# Patient Record
Sex: MALE | Race: WHITE | NOT HISPANIC OR LATINO | Employment: FULL TIME | URBAN - METROPOLITAN AREA
[De-identification: names, ages, dates, MRNs, and addresses within clinical notes are randomized per-mention and may not be internally consistent; named-entity substitution may affect disease eponyms.]

---

## 2017-08-08 ENCOUNTER — OFFICE VISIT (OUTPATIENT)
Dept: OPHTHALMOLOGY | Facility: CLINIC | Age: 42
End: 2017-08-08

## 2017-08-08 DIAGNOSIS — H02.726: ICD-10-CM

## 2017-08-08 DIAGNOSIS — H02.9 EYELID LESION: Primary | ICD-10-CM

## 2017-08-08 RX ORDER — ERYTHROMYCIN 5 MG/G
1 OINTMENT OPHTHALMIC 2 TIMES DAILY
Qty: 1 TUBE | Refills: 0
Start: 2017-08-08 | End: 2017-08-15

## 2017-08-08 RX ORDER — HYDROCODONE BITARTRATE AND ACETAMINOPHEN 5; 325 MG/1; MG/1
1-2 TABLET ORAL EVERY 6 HOURS PRN
Qty: 15 TABLET | Refills: 0 | Status: SHIPPED | OUTPATIENT
Start: 2017-08-08 | End: 2017-10-24

## 2017-08-08 RX ORDER — LIDOCAINE HYDROCHLORIDE AND EPINEPHRINE 15; 5 MG/ML; UG/ML
1 INJECTION, SOLUTION EPIDURAL ONCE
Qty: 1 ML | Refills: 0
Start: 2017-08-08 | End: 2017-08-08

## 2017-08-08 ASSESSMENT — VISUAL ACUITY
METHOD: SNELLEN - LINEAR
OD_SC: 20/30
OS_SC: 20/25
OD_SC+: -1

## 2017-08-08 ASSESSMENT — CONF VISUAL FIELD
OD_NORMAL: 1
OS_NORMAL: 1
METHOD: COUNTING FINGERS

## 2017-08-08 ASSESSMENT — TONOMETRY
IOP_METHOD: ICARE
OD_IOP_MMHG: 15
OS_IOP_MMHG: 14

## 2017-08-08 ASSESSMENT — SLIT LAMP EXAM - LIDS: COMMENTS: NORMAL

## 2017-08-08 NOTE — NURSING NOTE
Chief Complaints and History of Present Illnesses   Patient presents with     Consult For     Chalazion     HPI    Affected eye(s):  Left   Symptoms:     No blurred vision      Frequency:  Constant       Do you have eye pain now?:  No      Comments:  Pt states chalazion/stye started 4 months ago, pt used warm compresses- but feels it helped only a little bit. Zylet and Doxycycline 100mg.    Adrian Burnett COT 7:34 AM August 8, 2017

## 2017-08-08 NOTE — MR AVS SNAPSHOT
After Visit Summary   8/8/2017    Abhishek Delatorre    MRN: [id number]           Patient Information     Date Of Birth          1975        Visit Information        Provider Department      8/8/2017 7:30 AM Blanka Sequeira MD [facility]        Today's Diagnoses     Eyelid lesion    -  1    Madarosis, left           Follow-ups after your visit        Your next 10 appointments already scheduled     Aug 29, 2017  8:15 AM CDT   (Arrive by 8:00 AM)   Post-Op with Blanka Sequeira MD   [facility] ([facility])    [address] [place] [address]  [address] [facility] [place] [address] [phone]              Future tests that were ordered for you today     Open Future Orders        Priority Expected Expires Ordered    Surgical pathology exam Routine  11/6/2017 8/8/2017            Who to contact     Please call your clinic at [phone] to:    Ask questions about your health    Make or cancel appointments    Discuss your medicines    Learn about your test results    Speak to your doctor   If you have compliments or concerns about an experience at your clinic, or if you wish to file a complaint, please contact [facility] Patient Relations at [phone] or email us at [email or web address]         Additional Information About Your Visit        MyChart Information     [employer] gives you secure access to your electronic health record. If you see a primary care provider, you can also send messages to your care team and make appointments. If you have questions, please call your primary care clinic.  If you do not have a primary care provider, please call [phone] and they will assist you.      [employer] is an electronic gateway that provides easy, online access to your medical records. With [employer], you can request a clinic appointment, read your test results, renew a prescription or communicate with your care team.     To access  your existing account, please contact your [facility] or call [phone] for assistance.        Care EveryWhere ID     This is your Care EveryWhere ID. This could be used by other organizations to access your [facility] medical records  [id number]         Blood Pressure from Last 3 Encounters:   10/17/16 119/74   07/27/11 110/70   06/28/11 110/70    Weight from Last 3 Encounters:   10/17/16 80.7 kg (178 lb)   07/27/11 78.5 kg (173 lb)   06/06/11 79 kg (174 lb 3.2 oz)              We Performed the Following     External Photos OS (left eye)     Wedge resetion less than 25% of eyelid          Today's Medication Changes          These changes are accurate as of: 8/8/17  8:30 AM.  If you have any questions, ask your nurse or doctor.               These medicines have changed or have updated prescriptions.        Dose/Directions    * HYDROcodone-acetaminophen 5-325 MG per tablet   Commonly known as:  NORCO   This may have changed:  Another medication with the same name was added. Make sure you understand how and when to take each.   Used for:  Encounter for vasectomy   Changed by:  Vish Vizcarra MD        Dose:  1 tablet   Take 1 tablet by mouth every 6 hours as needed for pain.   Quantity:  20 tablet   Refills:  0       * HYDROcodone-acetaminophen 5-325 MG per tablet   Commonly known as:  NORCO   This may have changed:  You were already taking a medication with the same name, and this prescription was added. Make sure you understand how and when to take each.   Used for:  Eyelid lesion, Madarosis, left   Changed by:  Blanka Sequeira MD        Dose:  1-2 tablet   Take 1-2 tablets by mouth every 6 hours as needed for moderate to severe pain   Quantity:  15 tablet   Refills:  0       * Notice:  This list has 2 medication(s) that are the same as other medications prescribed for you. Read the directions carefully, and ask your doctor or other care provider to review them with you.          Where to get your medicines      Some of these will need a paper prescription and others can be bought over the counter.  Ask your nurse if you have questions.     Bring a paper prescription for each of these medications     HYDROcodone-acetaminophen 5-325 MG per tablet                Primary Care Provider    None Specified       No primary provider on file.        Equal Access to Services     GREGORIO HENDERSON : Hadii aad ku hadvictroiachyna Sorosi, waaxda luqadaha, qaybta kaalmada emmiesaniajorge, alfonso aguilarevenserna barahona. So [facility] [phone].    ATENCIÓN: Si habla español, tiene a marshall disposición servicios gratuitos de asistencia lingüística. Llame al [phone].    We comply with applicable federal civil rights laws and [place] laws. We do not discriminate on the basis of race, color, national origin, age, disability sex, sexual orientation or gender identity.            Thank you!     Thank you for choosing [facility]  for your care. Our goal is always to provide you with excellent care. Hearing back from our patients is one way we can continue to improve our services. Please take a few minutes to complete the written survey that you may receive in the mail after your visit with us. Thank you!             Your Updated Medication List - Protect others around you: Learn how to safely use, store and throw away your medicines at www.disposemymeds.org.          This list is accurate as of: 8/8/17  8:30 AM.  Always use your most recent med list.                   Brand Name Dispense Instructions for use Diagnosis    * HYDROcodone-acetaminophen 5-325 MG per tablet    NORCO    20 tablet    Take 1 tablet by mouth every 6 hours as needed for pain.    Encounter for vasectomy       * HYDROcodone-acetaminophen 5-325 MG per tablet    NORCO    15 tablet    Take 1-2 tablets by mouth every 6 hours as needed for moderate to severe pain    Eyelid lesion, Madarosis, left       MULTI-VITAMIN PO      Take  by  mouth daily.        * Notice:  This list has 2 medication(s) that are the same as other medications prescribed for you. Read the directions carefully, and ask your doctor or other care provider to review them with you.

## 2017-08-08 NOTE — PROGRESS NOTES
Chief Complaints and History of Present Illnesses   Patient presents with     Consult For     Chalazion     Abhishek Delatorre is a 41 year old presenting for evaluation of a left upper lid mass first noted four months ago when he was in [place]. It has been moderately uncomfortable. Initially it grew rather quickly initially. He tried warm compresses with no resolution. He saw an ophthalmologist in [place] who prescribed doxycycline and Zylet, which did not lead to resolution but it seems to have stabilized. The ophthalmologist in [place] had discussed the option of incising and draining the area, but decided against it because he was from out of state and had to drive. He has noticed some eyelash loss, which he attributes to the warm compresses and scrubbing he has been doing. It has had some discomfort but not recently. No prior cutaneous malignancy.          Assessment & Plan     Abhishek Delatorre is a 41 year old male with the following diagnoses:   1. Eyelid lesion    2. Madarosis, left       On examination I suspect this is a chalazion but madarosis is unusual and raises concern for an underlying malignancy masquerading as a chalazion. I recommend pentagonal wedge with specimen being sent to pathology. He requests this be done today as he has been dealing with this for quite some time, we will accommodate this.   Photographs were obtained to document the exam findings for future reference. They were consistent with examination as documented.     Zylet (already has), ees, ice, will give norco script but not necessarily fill, f/u 2-3 weeks.          Complete documentation of historical and exam elements from today's encounter can be found in the full encounter summary report (not reduplicated in this progress note). I personally obtained the chief complaint(s) and history of present illness.  I confirmed and edited as necessary the review of systems, past medical/surgical history, family history, social history, and  examination findings as documented by others; and I examined the patient myself. I personally reviewed the relevant tests, images, and reports as documented above. I formulated and edited as necessary the assessment and plan and discussed the findings and management plan with the patient and family. - Blanka Sequeira MD    PREOPERATIVE DIAGNOSIS: Left upper eyelid mass  POSTOPERATIVE DIAGNOSIS: Left upper eyelid mass  PROCEDURE: Wedge resection and reconstruction, left  upper eyelid (less than 25% of eyelid margin).   SURGEON: Blanka Sequeira MD  ASSISTANT: None  ANESTHESIA: local infiltration of 1% lidocaine with epinephrine  COMPLICATIONS: None.   ESTIMATED BLOOD LOSS: Less than 5 cc.   SPECIMEN: Eyelid lesion,   INDICATIONS: Abhishek Delatorre presented with a left  upper lid mass that was enlarging in size. After the risks, benefits and alternatives to the proposed procedure were explained Informed Consent was obtained from the patient.   DESCRIPTION OF PROCEDURE: The patient was brought to the room and placed supine on the minor room table. The left  Upper lid was infiltrated with local anesthetic. The patient was prepped and draped in the typical fashion. Attention was directed to the left side. The area of the mass was outlined in a pentagonal wedge. This was excised with a Kala scissors. Hemostasis was obtained with a high-temperature cautery. The margin was reconstructed with an internal buried vertical mattress 6-0 Vicryl suture. Additional Vicryl sutures were placed at the lash line, and the tarsal plate was closed with partial thickness 5-0 Vicryl sutures. The skin was closed with interrupted 6-0 plain gut sutures. Erythromycin ophthalmic ointment was applied. The patient tolerated the procedure well and left the operating room in stable condition.   Blanka Sequeira MD

## 2017-08-08 NOTE — LETTER
"August 15, 2017      Jayda Delatorre  [address] [address]        Dear Jayda,    Please see below for your test results.    Resulted Orders   Surgical pathology exam   Result Value Ref Range    Copath Report       Patient Name: JAYDA DELATORRE  MR#: [id number]  Specimen #: [id number]  Collected: 8/8/2017  Received: 8/8/2017  Reported: 8/14/2017 19:45  Ordering Phy(s): SANNA DELVALLE    For improved result formatting, select 'View Enhanced Report Format'  under Linked Documents section.    SPECIMEN(S):  Skin, left upper eylid    FINAL DIAGNOSIS:  Skin, eyelid mass, left upper:  - Granulomatous tissue reaction, favor chalazion - (see description)    I have personally reviewed all specimens and or slides, including the  listed special stains, and used them with my medical judgement to  determine the final diagnosis.    Electronically signed out by:    Doroteo Ye M.D., [facility]    CLINICAL HISTORY:  The patient is a 41-year-old male.    GROSS:  The specimen is received in formalin with proper patient identification,  labeled \"left upper eyelid mass\".  The specimen consists of a 1.2 x 0.6  x 0.5 cm eyelid resection.  The specimen is surfaced by a tan-pink  hairbearing skin that transitions  into a tan-white smooth conjunctiva.  On the conjunctival surface is a 0.2 x 0.2 x 0.1 cm yellow lobulated  ovoid nodule that comes within 0.1 cm of the resection margin (inked  blue).  Upon sectioning the lesion is confined to the conjunctival  surface.  The specimen is entirely submitted sequentially from the  conjunctival and skin resection margins to the tip of the eyelid.    Summary of Sections:  1 - skin and conjunctival and en face  2 - remainder of specimen (Dictated by: Dilip Cortez 8/8/2017 10:49 AM)    MICROSCOPIC:  The specimen is an excision of eyelid tissue which includes both  epidermal and mucosal surfaces.  Within the dermis is a focally intense  inflammatory infiltrate consisting of " lymphocytes, histiocytes,  multinucleated histiocytes, plasma cells, and neutrophils.  This  infiltrate forms granulomas.  Some of these granulomas surround  eosinophilic, nonpolarizable material consistent with keratin debris.  Other granulomas surround clear spaces, which may be consiste nt with  lipid deposition.  There are numerous sebaceous glands within the  dermis.  No polarizable foreign material is seen.  PAS, Gram's, and Clemencia  stains are negative for fungi, bacteria, and mycobacteria, respectively.   The findings are those for granulomatous tissue reaction, with features  favoring a chalazion.  An infectious process cannot be entirely  excluded, and clinical correlation and followup is recommended.    CPT Codes:  A: 40430-QZ3.T, 45754-UU3.P, 01410-MEA, 31176-VPJ, 20401-RLO    TESTING LAB LOCATION:  [facility], [address]   [address]  [phone]    COLLECTION SITE:  Client: [facility]  Location: [facility] (B)         If you have any questions, please call the clinic.    Sincerely,    Blanka Sequeira MD

## 2017-08-14 LAB — COPATH REPORT: NORMAL

## 2017-08-29 ENCOUNTER — OFFICE VISIT (OUTPATIENT)
Dept: OPHTHALMOLOGY | Facility: CLINIC | Age: 42
End: 2017-08-29

## 2017-08-29 DIAGNOSIS — H02.9 EYELID LESION: Primary | ICD-10-CM

## 2017-08-29 ASSESSMENT — VISUAL ACUITY
METHOD: SNELLEN - LINEAR
OD_SC: 20/25
OD_SC+: -2
OS_SC+: -3
OS_SC: 20/20

## 2017-08-29 ASSESSMENT — TONOMETRY
OS_IOP_MMHG: 11
OD_IOP_MMHG: 12
IOP_METHOD: ICARE

## 2017-08-29 NOTE — MR AVS SNAPSHOT
After Visit Summary   8/29/2017    Abhishek Delatorre    MRN: [id number]           Patient Information     Date Of Birth          1975        Visit Information        Provider Department      8/29/2017 8:15 AM Blanka Sequeira MD [facility]        Today's Diagnoses     Eyelid lesion    -  1       Follow-ups after your visit        Who to contact     Please call your clinic at [phone] to:    Ask questions about your health    Make or cancel appointments    Discuss your medicines    Learn about your test results    Speak to your doctor   If you have compliments or concerns about an experience at your clinic, or if you wish to file a complaint, please contact [facility] Patient Relations at [phone] or email us at [email or web address]         Additional Information About Your Visit        MyChart Information     [employer] gives you secure access to your electronic health record. If you see a primary care provider, you can also send messages to your care team and make appointments. If you have questions, please call your primary care clinic.  If you do not have a primary care provider, please call [phone] and they will assist you.      [employer] is an electronic gateway that provides easy, online access to your medical records. With [employer], you can request a clinic appointment, read your test results, renew a prescription or communicate with your care team.     To access your existing account, please contact your [facility] or call [phone] for assistance.        Care EveryWhere ID     This is your Care EveryWhere ID. This could be used by other organizations to access your [facility] medical records  [id number]         Blood Pressure from Last 3 Encounters:   10/17/16 119/74   07/27/11 110/70   06/28/11 110/70    Weight from Last 3 Encounters:   10/17/16 80.7 kg (178 lb)   07/27/11 78.5 kg (173 lb)   06/06/11  79 kg (174 lb 3.2 oz)              Today, you had the following     No orders found for display       Primary Care Provider    None Specified       No primary provider on file.        Equal Access to Services     GREGORIO HENDERSON : Hadii aad ku hadvictoriachyna Whitney, jdjorge jacksonpiedadha, nikki jaironancy dougherty, alfonso marinellirene [place]. So [facility] [phone].    ATENCIÓN: Si habla español, tiene a marshall disposición servicios gratuitos de asistencia lingüística. Llame al [phone].    We comply with applicable federal civil rights laws and [place] laws. We do not discriminate on the basis of race, color, national origin, age, disability sex, sexual orientation or gender identity.            Thank you!     Thank you for choosing [facility]  for your care. Our goal is always to provide you with excellent care. Hearing back from our patients is one way we can continue to improve our services. Please take a few minutes to complete the written survey that you may receive in the mail after your visit with us. Thank you!             Your Updated Medication List - Protect others around you: Learn how to safely use, store and throw away your medicines at www.disposemymeds.org.          This list is accurate as of: 8/29/17  8:41 AM.  Always use your most recent med list.                   Brand Name Dispense Instructions for use Diagnosis    * HYDROcodone-acetaminophen 5-325 MG per tablet    NORCO    20 tablet    Take 1 tablet by mouth every 6 hours as needed for pain.    Encounter for vasectomy       * HYDROcodone-acetaminophen 5-325 MG per tablet    NORCO    15 tablet    Take 1-2 tablets by mouth every 6 hours as needed for moderate to severe pain    Eyelid lesion, Madarosis, left       MULTI-VITAMIN PO      Take  by mouth daily.        * Notice:  This list has 2 medication(s) that are the same as other medications prescribed for you. Read the directions carefully, and ask your doctor or other care  provider to review them with you.

## 2017-08-29 NOTE — NURSING NOTE
Chief Complaints and History of Present Illnesses   Patient presents with     Post Op (Ophthalmology) Left Eye     HPI    Affected eye(s):  Left   Symptoms:     No blurred vision      Frequency:  Constant       Do you have eye pain now?:  No      Comments:  3 week postop s/p Wedge resection and reconstruction, left  upper eyelid (less than 25% of eyelid margin) on 8/08/2017. Pt states no pain or discomfort. Healing well. Done with ointment.    Adrian Burnett COT 8:18 AM August 29, 2017

## 2017-08-29 NOTE — PROGRESS NOTES
Abhishek Delatorre is status post left pentagonal wedge.  Incision(s) healing well.  The lid(s)  is  in excellent position with good alignment. Small residual edema.    FINAL DIAGNOSIS:   Skin, eyelid mass, left upper:   - Granulomatous tissue reaction, favor chalazion    I have recommended:  * Continue antibiotic ointment or bland lubricating ointment (eg vaseline or aquaphor) to the incision site BID.  * Massage along the incision 2-3 x daily.   * Warm soaks 3-4x daily until all edema and ecchymoses resolve  * Return to clinic in 2 months     Attending Physician Attestation:  Complete documentation of historical and exam elements from today's encounter can be found in the full encounter summary report (not reduplicated in this progress note).  I personally obtained the chief complaint(s) and history of present illness.  I confirmed and edited as necessary the review of systems, past medical/surgical history, family history, social history, and examination findings as documented by others; and I examined the patient myself.  I personally reviewed the relevant tests, images, and reports as documented above.  I formulated and edited as necessary the assessment and plan and discussed the findings and management plan with the patient and family. - Blanka Sequeira MD

## 2017-10-24 ENCOUNTER — OFFICE VISIT (OUTPATIENT)
Dept: FAMILY MEDICINE | Facility: CLINIC | Age: 42
End: 2017-10-24
Payer: COMMERCIAL

## 2017-10-24 VITALS — TEMPERATURE: 98.2 F

## 2017-10-24 DIAGNOSIS — Z23 NEED FOR VACCINATION: ICD-10-CM

## 2017-10-24 DIAGNOSIS — Z71.84 TRAVEL ADVICE ENCOUNTER: Primary | ICD-10-CM

## 2017-10-24 PROCEDURE — 99401 PREV MED CNSL INDIV APPRX 15: CPT | Mod: 25 | Performed by: NURSE PRACTITIONER

## 2017-10-24 PROCEDURE — 90715 TDAP VACCINE 7 YRS/> IM: CPT | Mod: GA | Performed by: NURSE PRACTITIONER

## 2017-10-24 PROCEDURE — 90471 IMMUNIZATION ADMIN: CPT | Mod: GA | Performed by: NURSE PRACTITIONER

## 2017-10-24 PROCEDURE — 90472 IMMUNIZATION ADMIN EACH ADD: CPT | Mod: GA | Performed by: NURSE PRACTITIONER

## 2017-10-24 PROCEDURE — 90691 TYPHOID VACCINE IM: CPT | Mod: GA | Performed by: NURSE PRACTITIONER

## 2017-10-24 PROCEDURE — 90636 HEP A/HEP B VACC ADULT IM: CPT | Mod: GA | Performed by: NURSE PRACTITIONER

## 2017-10-24 RX ORDER — AZITHROMYCIN 500 MG/1
500 TABLET, FILM COATED ORAL DAILY
Qty: 3 TABLET | Refills: 0 | Status: SHIPPED | OUTPATIENT
Start: 2017-10-24 | End: 2017-10-27

## 2017-10-24 RX ORDER — ATOVAQUONE AND PROGUANIL HYDROCHLORIDE 250; 100 MG/1; MG/1
1 TABLET, FILM COATED ORAL DAILY
Qty: 15 TABLET | Refills: 0 | Status: SHIPPED | OUTPATIENT
Start: 2017-10-24 | End: 2020-01-06

## 2017-10-24 NOTE — NURSING NOTE
Screening Questionnaire for Adult Immunization    Are you sick today?   No   Do you have allergies to medications, food, a vaccine component or latex?   No   Have you ever had a serious reaction after receiving a vaccination?   No   Do you have a long-term health problem with heart disease, lung disease, asthma, kidney disease, metabolic disease (e.g. diabetes), anemia, or other blood disorder?   No   Do you have cancer, leukemia, HIV/AIDS, or any other immune system problem?   No   In the past 3 months, have you taken medications that affect  your immune system, such as prednisone, other steroids, or anticancer drugs; drugs for the treatment of rheumatoid arthritis, Crohn s disease, or psoriasis; or have you had radiation treatments?   No   Have you had a seizure, or a brain or other nervous system problem?   No   During the past year, have you received a transfusion of blood or blood     products, or been given immune (gamma) globulin or antiviral drug?   No   For women: Are you pregnant or is there a chance you could become        pregnant during the next month?   No   Have you received any vaccinations in the past 4 weeks?   No     Immunization questionnaire answers were all negative.      Prior to injection verified patient identity using patient's name and date of birth.    Per orders of PEGGY Chauhan, injection of Typhoid, Twinrix, and Adacel  given by Daya Chin. Patient instructed to remain in clinic for 15 minutes afterwards, and to report any adverse reaction to me immediately.       Screening performed by Daya Chin on 10/24/2017 at 6:36 PM.

## 2017-10-24 NOTE — MR AVS SNAPSHOT
After Visit Summary   10/24/2017    Abhishek Delatorre    MRN: [id number]           Patient Information     Date Of Birth          1975        Visit Information        Provider Department      10/24/2017 5:45 PM Gracie Chauhan APRN CNP [facility]        Today's Diagnoses     Travel advice encounter    -  1    Need for vaccination          Care Instructions    Today October 24, 2017 you received the    Twinrix (Hepatitis A & B combo) Vaccine - Please return on 11/23/17 for your 2nd dose and 4/22/18 or later for your 3rd and final dose.    Tetanus (Tdap) Vaccine    Typhoid - injectable. This vaccine is valid for two years.   .    These appointments can be made as a NURSE ONLY visit.    **It is very important for the vaccinations to be given on the scheduled day(s), this helps ensure you receive the full effectiveness of the vaccine.**    Please call [facility] with any questions [phone]    Thank you for visiting [facility]              Follow-ups after your visit        Future tests that were ordered for you today     Open Standing Orders        Priority Remaining Interval Expires Ordered    HEPA/HEPB VACCINE ADULT IM Routine 2/3  9/24/2018 10/24/2017            Who to contact     If you have questions or need follow up information about today's clinic visit or your schedule please contact [facility] directly at [phone].  Normal or non-critical lab and imaging results will be communicated to you by MyChart, letter or phone within 4 business days after the clinic has received the results. If you do not hear from us within 7 days, please contact the clinic through MyChart or phone. If you have a critical or abnormal lab result, we will notify you by phone as soon as possible.  Submit refill requests through [employer] or call your pharmacy and they will forward the refill request to us. Please allow 3 business days for your refill  to be completed.          Additional Information About Your Visit        [facility] Information     [facility] gives you secure access to your electronic health record. If you see a primary care provider, you can also send messages to your care team and make appointments. If you have questions, please call your primary care clinic.  If you do not have a primary care provider, please call [phone] and they will assist you.        Care EveryWhere ID     This is your Care EveryWhere ID. This could be used by other organizations to access your [facility] medical records  [id number]         Blood Pressure from Last 3 Encounters:   10/17/16 119/74   07/27/11 110/70   06/28/11 110/70    Weight from Last 3 Encounters:   10/17/16 178 lb (80.7 kg)   07/27/11 173 lb (78.5 kg)   06/06/11 174 lb 3.2 oz (79 kg)              We Performed the Following     TDAP VACCINE (ADACEL)     TYPHOID VACCINE, IM          Today's Medication Changes          These changes are accurate as of: 10/24/17  6:09 PM.  If you have any questions, ask your nurse or doctor.               Start taking these medicines.        Dose/Directions    atovaquone-proguanil 250-100 MG per tablet   Commonly known as:  MALARONE   Used for:  Need for vaccination, Travel advice encounter   Started by:  Gracie Chauhan APRN CNP        Dose:  1 tablet   Take 1 tablet by mouth daily Start 2 days before exposure to Malaria and continue daily till  7 days after exposure.   Quantity:  15 tablet   Refills:  0       azithromycin 500 MG tablet   Commonly known as:  ZITHROMAX   Used for:  Travel advice encounter   Started by:  Gracie Chauhan APRN CNP        Dose:  500 mg   Take 1 tablet (500 mg) by mouth daily for 3 doses Take 1 tablet a day for up to 3 days for severe diarrhea   Quantity:  3 tablet   Refills:  0            Where to get your medicines      These medications were sent to [facility] [address] [place], [place] - [address]   [address] [place] [address]    Hours:  24-hours Phone:  [phone]     atovaquone-proguanil 250-100 MG per tablet    azithromycin 500 MG tablet                Primary Care Provider Office Phone # Fax #    [facility] [phone]       [address] [facility] [address]  [facility] [address]        Equal Access to Services     ALEX HENDERSON : Hadii aad ku hadasho Soomaali, waaxda luqadaha, qaybta kaalmada adeegyada, waxay idiin hayaan adeeg khevenssh lasamueln ah. So [facility] [phone].    ATENCIÓN: Si habla espsadi, tiene a marshall disposición servicios gratuitos de asistencia lingüística. Rosanne al [phone].    We comply with applicable federal civil rights laws and [place] laws. We do not discriminate on the basis of race, color, national origin, age, disability, sex, sexual orientation, or gender identity.            Thank you!     Thank you for choosing [facility]  for your care. Our goal is always to provide you with excellent care. Hearing back from our patients is one way we can continue to improve our services. Please take a few minutes to complete the written survey that you may receive in the mail after your visit with us. Thank you!             Your Updated Medication List - Protect others around you: Learn how to safely use, store and throw away your medicines at www.disposemymeds.org.          This list is accurate as of: 10/24/17  6:09 PM.  Always use your most recent med list.                   Brand Name Dispense Instructions for use Diagnosis    atovaquone-proguanil 250-100 MG per tablet    MALARONE    15 tablet    Take 1 tablet by mouth daily Start 2 days before exposure to Malaria and continue daily till  7 days after exposure.    Need for vaccination, Travel advice encounter       azithromycin 500 MG tablet    ZITHROMAX    3 tablet    Take 1 tablet (500 mg) by mouth daily for 3 doses Take 1 tablet a day for up to 3 days for severe diarrhea    Travel advice  encounter       * HYDROcodone-acetaminophen 5-325 MG per tablet    NORCO    20 tablet    Take 1 tablet by mouth every 6 hours as needed for pain.    Encounter for vasectomy       * HYDROcodone-acetaminophen 5-325 MG per tablet    NORCO    15 tablet    Take 1-2 tablets by mouth every 6 hours as needed for moderate to severe pain    Eyelid lesion, Madarosis, left       MULTI-VITAMIN PO      Take  by mouth daily.        * Notice:  This list has 2 medication(s) that are the same as other medications prescribed for you. Read the directions carefully, and ask your doctor or other care provider to review them with you.

## 2017-10-24 NOTE — PROGRESS NOTES
Nurse Note      Itinerary:  [facility]       Departure Date: 12/22/2017      Return Date: 01/06/2018      Length of Trip 2 weeks       Reason for Travel: Tourism           Urban or rural: both      Accommodations: Hotel        IMMUNIZATION HISTORY  Have you received any immunizations within the past 4 weeks?  No  Have you ever fainted from having your blood drawn or from an injection?  No  Have you ever had a fever reaction to vaccination?  No  Have you ever had any bad reaction or side effect from any vaccination?  No  Have you ever had hepatitis A or B vaccine?  Yes  Do you live (or work closely) with anyone who has AIDS, an AIDS-like condition, any other immune disorder or who is on chemotherapy for cancer?  No  Do you have a family history of immunodeficiency?  No  Have you received any injection of immune globulin or any blood products during the past 12 months?  No    Patient roomed by AP Carlson  Abhishek Delatorre is a 41 year old male  seen today with spouse  with children for counsultation for international travel to [facility] for Tourism  Visiting friends and relatives.  Patient will be departing in  2 month(s) and staying for   2 week(s) and  traveling with family member(s).      Patient itinerary :  will be in the urban region [facility] and [facility] which presents risk for Dengue Fever, Chikungungya, Zika, Schistosomiasis, Japanese Encephalitis, Rabies, Ebola, food borne illnesses, motor vehicle accidents and Typhoid. exposure.      Patient's activities will include sightseeing and visiting friends and relatives.    Patient's country of birth is [place]    Special medical concerns: none    Pre-travel questionnaire was completed by patient and reviewed by provider.     Vitals: Temp 98.2  F (36.8  C) (Tympanic)  BMI= There is no height or weight on file to calculate BMI.    EXAM:  General:  Well-nourished, well-developed in no acute distress.  Appears to be stated age, interacts appropriately  and expresses understanding of information given to patient.    Current Outpatient Prescriptions   Medication Sig Dispense Refill     atovaquone-proguanil (MALARONE) 250-100 MG per tablet Take 1 tablet by mouth daily Start 2 days before exposure to Malaria and continue daily till  7 days after exposure. 15 tablet 0     Multiple Vitamin (MULTI-VITAMIN PO) Take  by mouth daily.       Patient Active Problem List   Diagnosis     CARDIOVASCULAR SCREENING; LDL GOAL LESS THAN 160     Allergies   Allergen Reactions     Sulfa Drugs          Immunizations discussed include:   Hepatitis A:  Twin Jose series started today  Hepatitis B: Twin Jose series started today  Influenza: deferred  Typhoid: Ordered/given today, risks, benefits and side effects reviewed  Rabies: Declined  reviewed managment of a animal bite or scratch (washing wound, seek medical care within 24 hours for post exposure prophylaxis )  Yellow Fever: Not indicated  Japanese Encephalitis: Not indicated  Meningococcus: Not indicated  Tetanus/Diphtheria: Ordered/given today, risks, benefits and side effects reviewed  Measles/Mumps/Rubella: Up to date  Cholera: Not needed  Polio: Up to date  Pneumococcal: Under age of 65  Varicella: Immune by disease history per patient report  Zostavax:  Not indicated  HPV:  Not indicated  TB:  Low risk    Altitude Exposure on this trip: none  Past tolerance to Altitude: na    ASSESSMENT/PLAN:    ICD-10-CM    1. Travel advice encounter Z71.89 TYPHOID VACCINE, IM     HEPA/HEPB VACCINE ADULT IM     TDAP VACCINE (ADACEL)     HEPA/HEPB VACCINE ADULT IM     atovaquone-proguanil (MALARONE) 250-100 MG per tablet     azithromycin (ZITHROMAX) 500 MG tablet   2. Need for vaccination Z23 TYPHOID VACCINE, IM     HEPA/HEPB VACCINE ADULT IM     TDAP VACCINE (ADACEL)     HEPA/HEPB VACCINE ADULT IM     atovaquone-proguanil (MALARONE) 250-100 MG per tablet     I have reviewed general recommendations for safe travel   including: food/water  precautions, insect precautions, safer sex   practices given high prevalence of Zika, HIV and other STDs,   roadway safety. Educational materials and [employer] report provided.    Malaraia prophylaxis recommended: Malarone  Symptomatic treatment for traveler's diarrhea: azithromycin  Altitude illness prevention and treatment: none      Evacuation insurance advised and resources were provided to patient.    Total visit time 20 minutes  with over 50% of time spent counseling patient as detailed above.    Gracie Chauhan CNP

## 2017-10-24 NOTE — PATIENT INSTRUCTIONS
Today October 24, 2017 you received the    Twinrix (Hepatitis A & B combo) Vaccine - Please return on 11/23/17 for your 2nd dose and 4/22/18 or later for your 3rd and final dose.    Tetanus (Tdap) Vaccine    Typhoid - injectable. This vaccine is valid for two years.   .    These appointments can be made as a NURSE ONLY visit.    **It is very important for the vaccinations to be given on the scheduled day(s), this helps ensure you receive the full effectiveness of the vaccine.**    Please call [facility] with any questions [phone]    Thank you for visiting [facility]

## 2017-12-05 ENCOUNTER — ALLIED HEALTH/NURSE VISIT (OUTPATIENT)
Dept: NURSING | Facility: CLINIC | Age: 42
End: 2017-12-05
Payer: COMMERCIAL

## 2017-12-05 DIAGNOSIS — Z71.84 TRAVEL ADVICE ENCOUNTER: ICD-10-CM

## 2017-12-05 DIAGNOSIS — Z23 NEED FOR VACCINATION: ICD-10-CM

## 2017-12-05 PROCEDURE — 90471 IMMUNIZATION ADMIN: CPT

## 2017-12-05 PROCEDURE — 90636 HEP A/HEP B VACC ADULT IM: CPT

## 2019-08-15 ENCOUNTER — OFFICE VISIT (OUTPATIENT)
Dept: ORTHOPEDICS | Facility: CLINIC | Age: 44
End: 2019-08-15
Payer: COMMERCIAL

## 2019-08-15 ENCOUNTER — ANCILLARY PROCEDURE (OUTPATIENT)
Dept: GENERAL RADIOLOGY | Facility: CLINIC | Age: 44
End: 2019-08-15
Attending: FAMILY MEDICINE
Payer: COMMERCIAL

## 2019-08-15 VITALS — BODY MASS INDEX: 23.3 KG/M2 | WEIGHT: 172 LBS | HEIGHT: 72 IN

## 2019-08-15 DIAGNOSIS — M79.662 PAIN IN LEFT LOWER LEG: ICD-10-CM

## 2019-08-15 DIAGNOSIS — M79.662 PAIN IN LEFT LOWER LEG: Primary | ICD-10-CM

## 2019-08-15 ASSESSMENT — MIFFLIN-ST. JEOR: SCORE: 1708.19

## 2019-08-15 NOTE — PROGRESS NOTES
[facility]  Orthopedics  Maicol Aguilar MD  08/15/2019     Name: Abhishek Delatorre  MRN: [id number]  Age: 43 year old  : 1975  Referring provider: Referred Self     Chief Complaint: Pain of the Left Lower Leg    Date of Injury: 19    History of Present Illness:   Abhishek Delatorre is a 43 year old male who presents today for evaluation of left leg pain.  The patient reports he is training for a long-distance race and 4 days ago went on what was a planned 14-mile run.  At mile 6, he started having discomfort in his left lower leg but kept going.  By mile 9, the pain had worsened to the point where it was unbearable and he had to stop.  He had to limp 3 miles to get home where he iced his shin and took Ibuprofen.  The following day it was still painful to walk but since then the pain has gradually improved.  He has not run since his injury.  The race is in approximately 1 month.  His main concern is that he may have a stress fracture.      Review of Systems:   A 10-point review of systems was obtained and is negative except for as noted in the HPI.     Medications:       atovaquone-proguanil (MALARONE) 250-100 MG per tablet, Take 1 tablet by mouth daily Start 2 days before exposure to Malaria and continue daily till  7 days after exposure., Disp: 15 tablet, Rfl: 0     Multiple Vitamin (MULTI-VITAMIN PO), Take  by mouth daily., Disp: , Rfl:     Allergies:  Sulfa drugs     Past Medical History:  History reviewed.  No significant past medical history.      Past Surgical History:  History reviewed. No pertinent past surgical history.     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 He denies tobacco use and admits to alcohol use.     Family History:  Substance abuse - mother, paternal grandmother  Hypertension - father     Physical Examination:  Height 1.829 m (6'), weight 78 kg (172 lb).    General: Alert, pleasant, no distress  Left lower leg: warm, well perfused, SILT throughout     Inspection: no soft tissue  swelling, ecchymosis, deformity     ROM:normal ROM of ankle and knee without pain      Strength:5/5 symmetric in ankle and knee     Palpation:mild ttp over distal anterior shin, no ttp over tibia, fibula     Special Tests: no pain with single leg hop    Imaging:   Radiographs of the left tibia and fibula - 2 views (08/15/2019)  FINDINGS: AP and lateral views of the tibia and fibula were obtained.  There is no comparison available. The knee and ankle joint spaces are preserved. No evidence of acute osseous abnormalities.                                                                    IMPRESSION: No evidence of acute osseous abnormalities.    I have independently reviewed the above imaging studies; the results were discussed with the patient.       Assessment:   43 year old male with left anterior shin pain.  Suspect muscular strain.  No evidence of stress fracture on x-rays today.    Plan:   - Relative rest with reduced running for the next 2 weeks during which time he can crosstrain as tolerated.  - Gradually return to his regular running program as long as this does not cause increased pain. Discussed return to running protocol.   - Stretching exercises for home provided  - if he has persistent pain upon return to running will consider repeat xray vs. Mri    Maicol Aguilar MD        Scribe Disclosure:  I, Meri Lewis, am serving as a scribe to document services personally performed by Maicol Aguilar MD at this visit, based upon the provider's statements to me. All documentation has been reviewed by the aforementioned provider prior to being entered into the official medical record.

## 2019-08-15 NOTE — Clinical Note
8/15/2019       RE: Abhishek Delatorre  [address]  [facility] [address]     Dear Colleague,    Thank you for referring your patient, Abhishek Delatorre, to the [facility] SPORTS AND ORTHOPAEDIC WALK IN CLINIC at [facility]. Please see a copy of my visit note below.          SPORTS & ORTHOPEDIC WALK-IN VISIT 8/15/2019    Primary Care Physician:      Training for a race. Sunday was a long slow run, at about mile 6 he felt something weird and tried to change form, at mile 9 he was in severe pain and had to walk the last 3 miles home. Painful to walk on Monday, Tuesday was uncomforted, Wednesday was more normal.     Reason for visit:     What part of your body is injured / painful?  left shin     What caused the injury /pain? Running     How long ago did your injury occur or pain begin? Sunday 8/11/19    What are your most bothersome symptoms? Pain and Swelling- minor     How would you characterize your symptom?  dull and sharp    What makes your symptoms better? Nothing    What makes your symptoms worse? Walking and running     Have you been previously seen for this problem? No    Medical History:    Any recent changes to your medical history? No    Any new medication prescribed since last visit? No    Have you had surgery on this body part before? No    Social History:    Occupation: Faculty here     Handedness: Right    Exercise: 3-4 days/week    Review of Systems:    Do you have fever, chills, weight loss? No    Do you have any vision problems? No    Do you have any chest pain or edema? No    Do you have any shortness of breath or wheezing?  No    Do you have stomach problems? No    Do you have any numbness or focal weakness? No    Do you have diabetes? No    Do you have problems with bleeding or clotting? No    Do you have an rashes or other skin lesions? No           [facility]  Orthopedics  Maicol Aguilar MD  08/15/2019     Name: Abhishek Delatorre  MRN: [id number]  Age: 43 year  old  : 1975  Referring provider: Referred Self     Chief Complaint: Pain of the Left Lower Leg    Date of Injury: 19    History of Present Illness:   Abhishek Delatorre is a 43 year old male who presents today for evaluation of left leg pain.  The patient reports he is training for a long-distance race and 4 days ago went on what was a planned 14-mile run.  At mile 6, he started having discomfort in his left lower leg but kept going.  By mile 9, the pain had worsened to the point where it was unbearable and he had to stop.  He had to limp 3 miles to get home where he iced his shin and took Ibuprofen.  The following day it was still painful to walk but since then the pain has gradually improved.  He has not run since his injury.  The race is in approximately 1 month.  His main concern is that he may have a stress fracture.      Review of Systems:   A 10-point review of systems was obtained and is negative except for as noted in the HPI.     Medications:       atovaquone-proguanil (MALARONE) 250-100 MG per tablet, Take 1 tablet by mouth daily Start 2 days before exposure to Malaria and continue daily till  7 days after exposure., Disp: 15 tablet, Rfl: 0     Multiple Vitamin (MULTI-VITAMIN PO), Take  by mouth daily., Disp: , Rfl:     Allergies:  Sulfa drugs     Past Medical History:  History reviewed.  No significant past medical history.      Past Surgical History:  History reviewed. No pertinent past surgical history.     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     Family History:  Substance abuse - mother, paternal grandmother  Hypertension - father     Physical Examination:  Height 1.829 m (6'), weight 78 kg (172 lb).    General: Alert, pleasant, no distress  ***: warm, well perfused, SILT throughout     Inspection: ***     ROM:***     Strength:***     Palpation:***     Special Tests: ***    Imaging:   Radiographs of the left tibia and fibula - 2  views (08/15/2019)  FINDINGS: AP and lateral views of the tibia and fibula were obtained.  There is no comparison available. The knee and ankle joint spaces are preserved. No evidence of acute osseous abnormalities.                                                                    IMPRESSION: No evidence of acute osseous abnormalities.    I have independently reviewed the above imaging studies; the results were discussed with the patient.       Assessment:   43 year old male with left anterior shin pain.  Suspect muscular strain.  No evidence of stress fracture on x-rays today.    Plan:   - Relative rest with avoiding running for the next week to 10 days during which time he can crosstrain as tolerated.  - Gradually return to his regular running program as long as this does not cause increased pain  - Stretching exercises for home provided      Scribe Disclosure:  I, Meri Lewis, am serving as a scribe to document services personally performed by Maicol Aguilar MD at this visit, based upon the provider's statements to me. All documentation has been reviewed by the aforementioned provider prior to being entered into the official medical record.        Again, thank you for allowing me to participate in the care of your patient.      Sincerely,    Maicol Aguilar MD

## 2019-08-15 NOTE — PROGRESS NOTES
SPORTS & ORTHOPEDIC WALK-IN VISIT 8/15/2019    Primary Care Physician:      Training for a race. Sunday was a long slow run, at about mile 6 he felt something weird and tried to change form, at mile 9 he was in severe pain and had to walk the last 3 miles home. Painful to walk on Monday, Tuesday was uncomforted, Wednesday was more normal.     Reason for visit:     What part of your body is injured / painful?  left shin     What caused the injury /pain? Running     How long ago did your injury occur or pain begin? Sunday 8/11/19    What are your most bothersome symptoms? Pain and Swelling- minor     How would you characterize your symptom?  dull and sharp    What makes your symptoms better? Nothing    What makes your symptoms worse? Walking and running     Have you been previously seen for this problem? No    Medical History:    Any recent changes to your medical history? No    Any new medication prescribed since last visit? No    Have you had surgery on this body part before? No    Social History:    Occupation: Faculty here     Handedness: Right    Exercise: 3-4 days/week    Review of Systems:    Do you have fever, chills, weight loss? No    Do you have any vision problems? No    Do you have any chest pain or edema? No    Do you have any shortness of breath or wheezing?  No    Do you have stomach problems? No    Do you have any numbness or focal weakness? No    Do you have diabetes? No    Do you have problems with bleeding or clotting? No    Do you have an rashes or other skin lesions? No

## 2019-10-22 ENCOUNTER — OFFICE VISIT (OUTPATIENT)
Dept: FAMILY MEDICINE | Facility: CLINIC | Age: 44
End: 2019-10-22
Payer: COMMERCIAL

## 2019-10-22 VITALS
SYSTOLIC BLOOD PRESSURE: 118 MMHG | TEMPERATURE: 98.1 F | BODY MASS INDEX: 23.46 KG/M2 | WEIGHT: 173.2 LBS | HEART RATE: 80 BPM | OXYGEN SATURATION: 99 % | DIASTOLIC BLOOD PRESSURE: 70 MMHG | HEIGHT: 72 IN

## 2019-10-22 DIAGNOSIS — Z00.00 ROUTINE GENERAL MEDICAL EXAMINATION AT A HEALTH CARE FACILITY: Primary | ICD-10-CM

## 2019-10-22 DIAGNOSIS — K40.90 UNILATERAL INGUINAL HERNIA WITHOUT OBSTRUCTION OR GANGRENE, RECURRENCE NOT SPECIFIED: ICD-10-CM

## 2019-10-22 PROCEDURE — 99213 OFFICE O/P EST LOW 20 MIN: CPT | Mod: 25 | Performed by: PHYSICIAN ASSISTANT

## 2019-10-22 PROCEDURE — 99396 PREV VISIT EST AGE 40-64: CPT | Performed by: PHYSICIAN ASSISTANT

## 2019-10-22 ASSESSMENT — MIFFLIN-ST. JEOR: SCORE: 1713.63
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
## 2019-10-22 NOTE — PATIENT INSTRUCTIONS
Follow up with Dr. Sevilla  Return to clinic for any new or worsening symptoms or go to ER Urgent care in off hours     Preventive Health Recommendations  Male Ages 40 to 49    Yearly exam:             See your health care provider every year in order to  o   Review health changes.   o   Discuss preventive care.    o   Review your medicines if your doctor has prescribed any.    You should be tested each year for STDs (sexually transmitted diseases) if you re at risk.     Have a cholesterol test every 5 years.     Have a colonoscopy (test for colon cancer) if someone in your family has had colon cancer or polyps before age 50.     After age 45, have a diabetes test (fasting glucose). If you are at risk for diabetes, you should have this test every 3 years.      Talk with your health care provider about whether or not a prostate cancer screening test (PSA) is right for you.    Shots: Get a flu shot each year. Get a tetanus shot every 10 years.     Nutrition:    Eat at least 5 servings of fruits and vegetables daily.     Eat whole-grain bread, whole-wheat pasta and brown rice instead of white grains and rice.     Get adequate Calcium and Vitamin D.     Lifestyle    Exercise for at least 150 minutes a week (30 minutes a day, 5 days a week). This will help you control your weight and prevent disease.     Limit alcohol to one drink per day.     No smoking.     Wear sunscreen to prevent skin cancer.     See your dentist every six months for an exam and cleaning.

## 2019-10-29 ENCOUNTER — TELEPHONE (OUTPATIENT)
Dept: SURGERY | Facility: CLINIC | Age: 44
End: 2019-10-29

## 2019-10-29 NOTE — TELEPHONE ENCOUNTER
Pre Visit Call and Assessment    Date of call:  10/29/2019    Phone numbers:  Home number on file [phone] (home)    Reached patient/confirmed appointment:  No - left message:   on voicemail  Patient care team/Primary provider:  Pasha Sanderson [place]    Referred to:  Dr. Shaggy Sevilla    Reason for visit:  Inguinal hernia consult

## 2019-10-31 ENCOUNTER — PATIENT OUTREACH (OUTPATIENT)
Dept: SURGERY | Facility: CLINIC | Age: 44
End: 2019-10-31

## 2019-10-31 ENCOUNTER — OFFICE VISIT (OUTPATIENT)
Dept: SURGERY | Facility: CLINIC | Age: 44
End: 2019-10-31
Payer: COMMERCIAL

## 2019-10-31 VITALS
HEART RATE: 73 BPM | OXYGEN SATURATION: 100 % | HEIGHT: 72 IN | SYSTOLIC BLOOD PRESSURE: 123 MMHG | TEMPERATURE: 97.5 F | WEIGHT: 174 LBS | DIASTOLIC BLOOD PRESSURE: 70 MMHG | BODY MASS INDEX: 23.57 KG/M2

## 2019-10-31 DIAGNOSIS — K40.90 RIGHT INGUINAL HERNIA: Primary | ICD-10-CM

## 2019-10-31 ASSESSMENT — MIFFLIN-ST. JEOR: SCORE: 1717.26

## 2019-10-31 ASSESSMENT — PAIN SCALES - GENERAL: PAINLEVEL: NO PAIN (0)

## 2019-10-31 NOTE — PROGRESS NOTES
Abhishek Delatorre is a 43 year old male with a 3 week history of a right inguinal mass with the following symptoms of lump.    Finding was not work related.  Onset did not occur with lifting.  Obstructive symptoms:  no  Urinary difficulties:  no  Chronic cough: no  Constipation:  no  Current level of activity:  Medium, teaches at [employer], active outside of work    Past medical and surgical history, medications, allergies, family history, and social history were reviewed with the patient. Had lap LIH 11 years ago with me - had anesthetic reaction requiring admission - no sequelae.     ROS: 10 point review of systems negative except noted in HPI  PHYSICAL EXAM  General appearance- healthy, alert, and in no distress.  Skin- Skin color and turgor normal.  No obvious rashes.  Neck- Neck is supple without obvious adenopathy.  Lungs- Respiratory effort unlabored.  Gait- Normal.  Abdomen - soft non distended, non tender without obvious masses.  Right inguinal hernia, small. Left without defect.  Impression:  Inguinal hernia, right  Recommendation:  Lap vs open mesh repair. Given previous reaction to GA, patient considering open repair. Will discuss with family.    A full discussion regarding the alternatives, risks, goals, and potential complications for this surgery was completed today.  The patient understood I would use non recalled mesh and  that the potential problems included but are not limited to:  Infection, bleeding, hematoma, seroma, recurrence, injury to nerve/muscle or involved testicle, ischemic orchitis, and chronic pain.    The patient verbally expressed understanding, was given the opportunity for questions, and gives full informed consent for either open vs lap robot procedure.      Today the patient was instructed on the need for a preoperative H&P, NPO status prior to surgery, and the need to stop anticoagulants prior to surgery.  Additional educational material, soap, and instructions will be mailed out to the  patients home.    The total time spent with this patient was 30 minutes.  Of this time, greater than 50% was spent counseling and coordinating care.

## 2019-10-31 NOTE — PATIENT INSTRUCTIONS
You met with Dr. Shaggy Sevilla.      Today's visit instructions:    Reminder:  Surgery Requirements  1. Your surgery will be at [facility]- 5th [facility] (if open surgery) or [facility] (if robotic)  2. You will need to arrive 1 1/2 to 2 hours early based on the location of your surgery.  3. You will need someone to drive you home (over 18 years old) and stay with you for 24 hours after the procedure  4. You will need a preop physical with your regular doctor (or PAC if requested by your surgeon) within 30 days of surgery- closer is always better  5. Stop any blood thinners, vitamins, minerals, or herbal supplements 5 days before surgery.  If you are taking a prescribed blood thinner please let us know for specific instructions  6. Fasting- a nurse from [facility] will call you 1-2 days before surgery to confirm your procedure and tell you when to stop eating and drinking  7. Wash with the soap the night before surgery and morning of surgery. See instructions in the Surgery Packet.  8. If you would like a procedure estimate please call Prisca JUNIOR Financial Counselor, [phone].    If you have questions please contact Isael RN or Tracy RN during regular clinic hours, Monday through Friday 7:30 AM - 4:00 PM, or you can contact us via [facility] at anytime.       If you have urgent needs after-hours, weekends, or holidays please call the hospital at [phone] and ask to speak with our on-call General Surgery Team.    Appointment [phone], option #1   Nurse Advice (Isael or Tracy): [phone]   Surgery Scheduler (Sally): [phone]  Fax: [phone]      After Your Laparoscopic Right Robotic Inguinal Hernia Repair         Incision care     Remove the bandage the day after surgery, but leave the medical tape (Steri-Strips) or glue in place. These will loosen and fall off on their own 5 to 7 days after surgery.     You may take a shower the day after  surgery. Carefully wash your incision with soap and water. Do not submerge yourself in water (bath, whirlpool, hot tub, pool, lake) for 14 days after surgery.       Always wash your hands before touching your incisions or removing bandages.     It is not unusual to form a collection of fluid or blood under your incision that may feel firm or squishy- it can take several weeks to months for your body to reabsorb it.  At times, it may even drain.  If that should happen keep the area clean with soap, water,  and cover with a clean gauze dressing. You can change this daily or as needed.     Other medicines     Wait to start aspirin or blood thinners until the day after surgery. You can continue your regular medicines at your normal time the day after surgery.     Your pain medicine may cause constipation (hard, dry stools). To help with this, take the stool softener your doctor gave you or an over-the-counter stool softener or laxative. You can stop taking this when you are no longer taking pain medicine and your bowel movements are back to normal.      For pain or discomfort     Take the narcotic pain medicine your doctor gave you as needed and as instructed on the bottle. If you prefer to use over-the-counter medication, use acetaminophen (Tylenol) or ibuprofen (Advil, Motrin) as instructed on the box. Do not take Tylenol if it is in your narcotic pain medication.     Use an ice pack on your groin for 20 minutes at a time as needed for the first 24 hours. Be sure to protect your skin by putting a cloth between the ice pack and your skin.     After 24 hours you can switch to heat for 20 minutes as needed. Be sure to protect your skin by putting a cloth between the heat pack and your skin.     It is normal to feel a lump in your groin after surgery. This lump may take up to 8 weeks to go away.      Activities     No driving until you feel it s safe to do so. Don t drive while taking narcotic pain medicine.     You can  return to your other activities as normal, no restrictions.      Special equipment     If we gave you an athletic supporter, wear this for the first 3 days after surgery. You can wear it longer than this if you wish.      Diet     You can eat your regular meals after surgery.      When to call the doctor   Call your doctor if you have:     A fever above 101 F (38.3 C) (taken under the tongue), or a fever or chills lasting more than a day.     Redness at the incision site.     Any fluid or blood draining from the incision, especially if it smells bad.      Severe pain that doesn t improve with pain medicine.      Go to the emergency room if:   You can t urinate (pee) or feel pressure when you urinate. We will place a small, thin tube (catheter) to empty your bladder. This needs to stay in place for at least 2 days.      We will call you 2 to 4 days after surgery to review this handout, answer questions and help arrange after-surgery care. If you have questions or concerns, please call [phone] during regular office hours. If you need to call after business hours, call [phone] and ask to page the surgeon on-call.

## 2019-10-31 NOTE — LETTER
10/31/2019       RE: Abhishek Delatorre  [address]  [place] [address]     Dear Colleague,    Thank you for referring your patient, Abhishek Delatorre, to the [facility] at [facility]. Please see a copy of my visit note below.    Abhishek Delatorre is a 43 year old male with a 3 week history of a right inguinal mass with the following symptoms of lump.    Finding was not work related.  Onset did not occur with lifting.  Obstructive symptoms:  no  Urinary difficulties:  no  Chronic cough: no  Constipation:  no  Current level of activity:  Medium, teaches at [employer], active outside of work    Past medical and surgical history, medications, allergies, family history, and social history were reviewed with the patient. Had lap LIH 11 years ago with me - had anesthetic reaction requiring admission - no sequelae.     ROS: 10 point review of systems negative except noted in HPI  PHYSICAL EXAM  General appearance- healthy, alert, and in no distress.  Skin- Skin color and turgor normal.  No obvious rashes.  Neck- Neck is supple without obvious adenopathy.  Lungs- Respiratory effort unlabored.  Gait- Normal.  Abdomen - soft non distended, non tender without obvious masses.  Right inguinal hernia, small. Left without defect.  Impression:  Inguinal hernia, right  Recommendation:  Lap vs open mesh repair. Given previous reaction to GA, patient considering open repair. Will discuss with family.    A full discussion regarding the alternatives, risks, goals, and potential complications for this surgery was completed today.  The patient understood I would use non recalled mesh and  that the potential problems included but are not limited to:  Infection, bleeding, hematoma, seroma, recurrence, injury to nerve/muscle or involved testicle, ischemic orchitis, and chronic pain.    The patient verbally expressed understanding, was given the opportunity for questions, and gives full informed consent for  either open vs lap robot procedure.      Today the patient was instructed on the need for a preoperative H&P, NPO status prior to surgery, and the need to stop anticoagulants prior to surgery.  Additional educational material, soap, and instructions will be mailed out to the patients home.    The total time spent with this patient was 30 minutes.  Of this time, greater than 50% was spent counseling and coordinating care.      Again, thank you for allowing me to participate in the care of your patient.      Sincerely,    Shaggy Sevilla MD

## 2019-10-31 NOTE — NURSING NOTE
Chief Complaint   Patient presents with     Consult     New consult for possible hernia surgery.       Vitals:    10/31/19 0934   BP: 123/70   BP Location: Left arm   Patient Position: Sitting   Cuff Size: Adult Large   Pulse: 73   Temp: 97.5  F (36.4  C)   TempSrc: Oral   SpO2: 100%   Weight: 78.9 kg (174 lb)   Height: 1.829 m (6')       Body mass index is 23.6 kg/m .                          Yamini Deleon CMA\

## 2019-11-05 ENCOUNTER — TELEPHONE (OUTPATIENT)
Dept: SURGERY | Facility: CLINIC | Age: 44
End: 2019-11-05

## 2019-11-05 NOTE — TELEPHONE ENCOUNTER
----- Message from Isael Metcalf RN sent at 10/31/2019 10:28 AM CDT -----  Nolai- pt not looking to schedule until this winter. He will call when ready.     He is deciding between open vs robotic. JG will place ws once pt calls to schedule and has decided.    Thanks,  Isael

## 2019-11-08 ENCOUNTER — HEALTH MAINTENANCE LETTER (OUTPATIENT)
Age: 44
End: 2019-11-08

## 2019-11-15 NOTE — ADDENDUM NOTE
Addended by: BERNADETTE MOSES on: 8/8/2017 08:49 AM     Modules accepted: Orders     Pre-Procedure Note/Attestation


Complete Prior to Procedure


Planned Procedure:  not applicable


Procedure Narrative:


Hardware removal of Cervical 56, Anterior cervical discectomy and fusion of 

Cervical 45 and Cervical 67





Indications for Procedure


Pre-Operative Diagnosis:


Herniation of Cervical 45,67 ,retained cervical 56





Attestation


I attest that I discussed the nature of the procedure; its benefits; risks and 

complications; and alternatives (and the risks and benefits of such alternatives

), prior to the procedure, with the patient (or the patient's legal 

representative).





I attest that, if there was a reasonable possibility of needing a blood 

transfusion, the patient (or the patient's legal representative) was given the 

[place] Department of Health Services standardized written summary, pursuant 

to the Maynor Augusto Blood Safety Act ([place] Health and Safety Code # 1645, as 

amended).





I attest that I re-evaluated the patient just prior to the surgery and that 

there has been no change in the patient's H&P, except as documented below:











Les Early MD Nov 15, 2019 10:55

## 2019-11-19 ENCOUNTER — TELEPHONE (OUTPATIENT)
Dept: SURGERY | Facility: CLINIC | Age: 44
End: 2019-11-19

## 2019-11-19 ENCOUNTER — PREP FOR PROCEDURE (OUTPATIENT)
Dept: SURGERY | Facility: CLINIC | Age: 44
End: 2019-11-19

## 2019-11-19 DIAGNOSIS — K40.90 UNILATERAL INGUINAL HERNIA WITHOUT OBSTRUCTION OR GANGRENE, RECURRENCE NOT SPECIFIED: Primary | ICD-10-CM

## 2019-11-19 RX ORDER — CEFAZOLIN SODIUM 2 G/50ML
2 SOLUTION INTRAVENOUS
Status: CANCELLED | OUTPATIENT
Start: 2019-11-19

## 2019-11-19 RX ORDER — CEFAZOLIN SODIUM 1 G/50ML
1 INJECTION, SOLUTION INTRAVENOUS SEE ADMIN INSTRUCTIONS
Status: CANCELLED | OUTPATIENT
Start: 2019-11-19

## 2019-11-19 NOTE — TELEPHONE ENCOUNTER
Patient called and he would like to proceed with scheduling his surgery as a robotic case. This information will be passed onto Dr. Sevilla as orders need to be placed. Patient is hoping for 01/10/2020 or 01/09/2020. Patient is aware that the robot will need to be secured for his surgery. He knows he can expect a call back to discuss.

## 2019-11-21 ENCOUNTER — TELEPHONE (OUTPATIENT)
Dept: SURGERY | Facility: CLINIC | Age: 44
End: 2019-11-21

## 2019-11-21 NOTE — TELEPHONE ENCOUNTER
Patient is scheduled for surgery with Dr. Shaggy Sevilla      Spoke with: Abhishek Delatorre about surgery dates.    Date of Surgery: 01/09/2020 at 2:20 PM with Dr. Shaggy Sevilla    Location:     [address] [address]      [phone]      [email or web address]    H&P: Scheduled with [facility] (P[facility]) 01/06/2020 at 9:30 AM. Patient has a history of reacting to anesthesia.     Informed patient they will need an adult : Yes    Special Equipment: Robotic case    Surgery packet: Mail and MyChart per patient request.    Additional comments: He also knows to call general surgery if he experiences any cold or flu symptoms. Surgery teaching and soap were given to in clinic on 10/31/2019. He was asked to call [phone] if he needs to reschedule and [phone] if he experiences any symptoms.

## 2019-11-21 NOTE — TELEPHONE ENCOUNTER
This writer called the patient to confirm scheduled procedure with Dr. Shaggy Sevilla and to schedule appointment with PAC, there was no answer. Left message with this writer's direct line [phone].

## 2019-11-22 NOTE — TELEPHONE ENCOUNTER
FUTURE VISIT INFORMATION      SURGERY INFORMATION:    Date: 20    Location: [facility] OR    Surgeon:  Shaggy Sevilla    Anesthesia Type:  General    RECORDS REQUESTED FROM:       Primary Care Provider: Pasha    Most recent EKG+ [id number]

## 2019-12-31 PROBLEM — H01.006 BLEPHARITIS OF LEFT EYE: Status: ACTIVE | Noted: 2017-04-01

## 2020-01-06 ENCOUNTER — OFFICE VISIT (OUTPATIENT)
Dept: SURGERY | Facility: CLINIC | Age: 45
End: 2020-01-06
Payer: COMMERCIAL

## 2020-01-06 ENCOUNTER — PRE VISIT (OUTPATIENT)
Dept: SURGERY | Facility: CLINIC | Age: 45
End: 2020-01-06

## 2020-01-06 ENCOUNTER — ANESTHESIA EVENT (OUTPATIENT)
Dept: SURGERY | Facility: CLINIC | Age: 45
End: 2020-01-06
Payer: COMMERCIAL

## 2020-01-06 VITALS
DIASTOLIC BLOOD PRESSURE: 79 MMHG | WEIGHT: 181 LBS | HEIGHT: 72 IN | BODY MASS INDEX: 24.52 KG/M2 | SYSTOLIC BLOOD PRESSURE: 116 MMHG | RESPIRATION RATE: 12 BRPM | OXYGEN SATURATION: 99 % | HEART RATE: 74 BPM | TEMPERATURE: 97.7 F

## 2020-01-06 DIAGNOSIS — Z01.818 PREOP EXAMINATION: ICD-10-CM

## 2020-01-06 DIAGNOSIS — K40.90 NON-RECURRENT UNILATERAL INGUINAL HERNIA WITHOUT OBSTRUCTION OR GANGRENE: ICD-10-CM

## 2020-01-06 DIAGNOSIS — Z01.818 PREOP EXAMINATION: Primary | ICD-10-CM

## 2020-01-06 LAB
ANION GAP SERPL CALCULATED.3IONS-SCNC: 2 MMOL/L (ref 3–14)
BUN SERPL-MCNC: 13 MG/DL (ref 7–30)
CALCIUM SERPL-MCNC: 9 MG/DL (ref 8.5–10.1)
CHLORIDE SERPL-SCNC: 107 MMOL/L (ref 94–109)
CO2 SERPL-SCNC: 31 MMOL/L (ref 20–32)
CREAT SERPL-MCNC: 0.79 MG/DL (ref 0.66–1.25)
ERYTHROCYTE [DISTWIDTH] IN BLOOD BY AUTOMATED COUNT: 11.5 % (ref 10–15)
GFR SERPL CREATININE-BSD FRML MDRD: >90 ML/MIN/{1.73_M2}
GLUCOSE SERPL-MCNC: 83 MG/DL (ref 70–99)
HCT VFR BLD AUTO: 48.1 % (ref 40–53)
HGB BLD-MCNC: 16 G/DL (ref 13.3–17.7)
MCH RBC QN AUTO: 31.1 PG (ref 26.5–33)
MCHC RBC AUTO-ENTMCNC: 33.3 G/DL (ref 31.5–36.5)
MCV RBC AUTO: 94 FL (ref 78–100)
NT-PROBNP SERPL-MCNC: 38 PG/ML (ref 0–125)
PLATELET # BLD AUTO: 319 10E9/L (ref 150–450)
POTASSIUM SERPL-SCNC: 4.2 MMOL/L (ref 3.4–5.3)
RBC # BLD AUTO: 5.14 10E12/L (ref 4.4–5.9)
SODIUM SERPL-SCNC: 140 MMOL/L (ref 133–144)
WBC # BLD AUTO: 8.5 10E9/L (ref 4–11)

## 2020-01-06 ASSESSMENT — PAIN SCALES - GENERAL: PAINLEVEL: NO PAIN (0)

## 2020-01-06 ASSESSMENT — MIFFLIN-ST. JEOR: SCORE: 1738.52

## 2020-01-06 ASSESSMENT — LIFESTYLE VARIABLES: TOBACCO_USE: 0

## 2020-01-06 NOTE — ANESTHESIA PREPROCEDURE EVALUATION
Anesthesia Pre-Procedure Evaluation    Patient: Abhishek Delatorre   MRN:     [id number] Gender:   male   Age:    44 year old :      1975        Preoperative Diagnosis: Unilateral inguinal hernia without obstruction or gangrene, recurrence not specified [K40.90]   Procedure(s):  HERNIORRHAPHY, INGUINAL, ROBOT-ASSISTED, WITH MESH, RIGHT     No past medical history on file.   Past Surgical History:   Procedure Laterality Date     AS REPAIR SLIDING INGUINAL HERNIA Left 2008          Anesthesia Evaluation     . Pt has had prior anesthetic. Type: MAC    History of anesthetic complications    Episode of hypotension postoperatively in 2008      ROS/MED HX    ENT/Pulmonary: Comment: URI over xmas. Has congestion upon awakening in AM that clears w/ cough.    (+), recent URI resolved . .   (-) tobacco use and asthma   Neurologic:  - neg neurologic ROS     Cardiovascular:  - neg cardiovascular ROS   (+) ----. : . . . :. . Previous cardiac testing date:results:date: results:ECG reviewed date: results: date: results:          METS/Exercise Tolerance: Comment: Runs regularly in warmer weather, walks dog. Goes to [employer] 2-3x/week for weight lifting. >4 METS   Hematologic:  - neg hematologic  ROS      (-) History of Transfusion   Musculoskeletal:  - neg musculoskeletal ROS       GI/Hepatic:  - neg GI/hepatic ROS      (-) GERD   Renal/Genitourinary:  - ROS Renal section negative       Endo:  - neg endo ROS       Psychiatric:  - neg psychiatric ROS       Infectious Disease:  - neg infectious disease ROS       Malignancy:      - no malignancy   Other:    (+) no H/O Chronic Pain,                       PHYSICAL EXAM:   Mental Status/Neuro: A/A/O; Age Appropriate   Airway: Facies: Feasible  Mallampati: II  Mouth/Opening: Full  TM distance: > 6 cm  Neck ROM: Full   Respiratory: Auscultation: CTAB     Resp. Rate: Normal     Resp. Effort: Normal      CV: Rhythm: Regular  Rate: Age appropriate  Heart: Normal Sounds  Edema: None  "  Comments:      Dental: Normal Dentition                LABS:  CBC:   Lab Results   Component Value Date    WBC 11.2 (H) 09/20/2008    HGB 15.2 09/20/2008    HGB 11.7 (L) 01/08/2008    HCT 44.6 09/20/2008     09/20/2008     BMP: No results found for: NA, POTASSIUM, CHLORIDE, CO2, BUN, CR, GLC  COAGS: No results found for: PTT, INR, FIBR  POC: No results found for: BGM, HCG, HCGS  OTHER: No results found for: PH, LACT, A1C, RENATA, PHOS, MAG, ALBUMIN, PROTTOTAL, ALT, AST, GGT, ALKPHOS, BILITOTAL, BILIDIRECT, LIPASE, AMYLASE, JAG, TSH, T4, T3, CRP, SED     Preop Vitals    BP Readings from Last 3 Encounters:   01/06/20 116/79   10/31/19 123/70   10/22/19 118/70    Pulse Readings from Last 3 Encounters:   01/06/20 74   10/31/19 73   10/22/19 80      Resp Readings from Last 3 Encounters:   01/06/20 12   06/06/11 16    SpO2 Readings from Last 3 Encounters:   01/06/20 99%   10/31/19 100%   10/22/19 99%      Temp Readings from Last 1 Encounters:   01/06/20 97.7  F (36.5  C) (Oral)    Ht Readings from Last 1 Encounters:   01/06/20 1.82 m (5' 11.65\")      Wt Readings from Last 1 Encounters:   01/06/20 82.1 kg (181 lb)    Estimated body mass index is 24.79 kg/m  as calculated from the following:    Height as of this encounter: 1.82 m (5' 11.65\").    Weight as of this encounter: 82.1 kg (181 lb).     LDA:        Assessment:   ASA SCORE: 1    H&P: History and physical reviewed and following examination; no interval change.   Smoking Status:  Non-Smoker/Unknown   NPO Status: NPO Appropriate     Plan:   Anes. Type:  General   Pre-Medication: Acetaminophen; Gabapentin (minimize narcotics )   Induction:  IV (Standard)   Airway: ETT; Oral   Access/Monitoring: PIV   Maintenance: Balanced     Drips/Meds: Ketamine (toradol if ok w/ surgeon )     Postop Plan:   Postop Pain: Opioids  Postop Sedation/Airway: Not planned  Disposition: Outpatient     PONV Management:   Adult Risk Factors:, Non-Smoker, Postop Opioids   Prevention: " Ondansetron, Dexamethasone     CONSENT: Direct conversation   Plan and risks discussed with: Patient   Blood Products: Consent Deferred (Minimal Blood Loss)                PAC Discussion and Assessment    ASA Classification: 2  Case is suitable for: [place]  Anesthetic techniques and relevant risks discussed: GA  Invasive monitoring and risk discussed: No  Types:   Possibility and Risk of blood transfusion discussed: No  NPO instructions given:   Additional anesthetic preparation and risks discussed:   Needs early admission to pre-op area:   Other:     PAC Resident/NP Anesthesia Assessment:  Abhishek Delatorre is a 44 year old male scheduled to undergo HERNIORRHAPHY, INGUINAL, ROBOT-ASSISTED, WITH MESH, RIGHT with Shaggy Sevilla MD on 1/9/20 at [facility] for treatment of a Unilateral inguinal hernia.    Pt has had prior anesthetic. Type: GA - Had episode of hypotension postoperatively in 2008    Pt had left inguinal hernia repair under GA in 2008 and had post op hypotension, primarily orthostatic, which necessitated overnight stay.  No etiology was uncovered.  He was discharged the following morning without complication or follow up.    He has the following specific operative considerations:   # CHARLES 1/8 = low risk  # VTE risk: 0.26%  # Risk of PONV score = 1.  If > 2, anti-emetic intervention recommended.    # Anesthesia considerations:  Refer to PAC assessment in anesthesia records    CARDIAC: METS >4,  Runs regularly in warmer weather, walks dog. Goes to [facility] 2-3x/week for weight lifting.     # RCRI : No serious cardiac risks.  0.4% risk of major adverse cardiac event.     #  Will check BNP today    PULMONARY:     # Never smoked    # No asthma or inhaler use    GI: no GERD      ENDO: BMI 24   # No DM    ORTHO: Full neck ROM, no TMJ    Patient was discussed with Dr Castro. Patient is optimized and is acceptable candidate for the proposed procedure, provided labs today are  within acceptable range. No further diagnostic evaluation is needed.      Reviewed and Signed by PAC Mid-Level Provider/Resident  Mid-Level Provider/Resident: Tiffanie Sanchez PA-C  Date: 1/6/20  Time: 1038    Attending Anesthesiologist Anesthesia Assessment:  I have examined the patient and reviewed the medical record.  I have discussed the patient with the JOSE and concur with her assessment  The patient is scheduled for robotic assisted laparoscopic inguinal hernia repair  He is in excellent general health with not cardiac or pulmonary disease.  He exercises regularly without symptoms  He has had recent URI without fever or significant sputum production  He is seen in PAC because in 2008 he had inguinal hernia repair under GA and had post op hypotension, primarily orthostatic, which necessitated overnight stay.  No etiology was uncovered.  He was discharged the following morning without complication or follow up.    PE:  WNWD thin muscular male in NAD.  MPC 1, six cm TMD,  Lung clear  CV  RRR without murmur    Post op hypotension in 2008 without sequale, etiology unclear but had fasted for < 10 hours so may have been preload deficient  Will check baseline labs and BNP to rule out systemic disease  Counseled patient to hydrate until two hours before surgery  Final plan per attending anesthesiologist the day of surgery.  Please note hypotension history    Reviewed and Signed by PAC Anesthesiologist  Anesthesiologist: Florencio Castro MD  Date: 1/6/2020  Time:   Pass/Fail:   Disposition:     PAC Pharmacist Assessment:        Pharmacist:   Date:   Time:    Tiffanie Sanchez PA-C

## 2020-01-06 NOTE — H&P
Pre-Operative H & P     CC:  Preoperative exam to assess for increased cardiopulmonary risk while undergoing surgery and anesthesia.    Date of Encounter: 1/6/2020  Primary Care Physician:  [facility]  Reason for Visit: Unilateral inguinal hernia without obstruction or gangrene, recurrence not specified    HPI  Abhishek Delatorre is a 43 y/o male who presents for pre-operative H&P in preparation for HERNIORRHAPHY, INGUINAL, ROBOT-ASSISTED, WITH MESH, RIGHT with Shaggy Sevilla MD on 1/9/20 at [facility] for treatment of a Unilateral inguinal hernia.    Mr. Delatorre has a history of a right inguinal mass that was first noted in October. This finding was not work related and did not occur with lifting. There are no obstructive symptoms and no urinary difficulties. He underwent surgical repair of a left inguinal hernia 11 yrs ago. He has now been counseled for the above procedure.    He had an episode of hypotension following his left inguinal hernia repair in 2008, resulting in an overnight admission. He was administered IV fluids and discharged home without complication    PMH is otherwise unremarkable.    History was obtained from patient & chart review.     Past Medical History  No past medical history on file.    Past Surgical History  Past Surgical History:   Procedure Laterality Date     AS REPAIR SLIDING INGUINAL HERNIA Left 2008       Hx of Blood transfusions/reactions: no     Hx of abnormal bleeding or anti-platelet use: no    Menstrual history: No LMP for male patient.: N/A    Steroid use in the last year: no    Personal or FH with difficulty with Anesthesia:  Had episode of hypotension postoperatively in 2008    Prior to Admission Medications  Current Outpatient Medications   Medication Sig Dispense Refill     Eyelid Cleansers (OCUSOFT EYELID CLEANSING EX) Externally apply 1 Application topically 2 times daily       Multiple Vitamin (MULTI-VITAMIN PO) Take 1  tablet by mouth twice a week          Allergies  Allergies   Allergen Reactions     Sulfa Drugs        Social History  Social History     Socioeconomic History     Marital status:      Spouse name: Not on file     Number of children: 2     Years of education: Not on file     Highest education level: Not on file   Occupational History     Not on file   Social Needs     Financial resource strain: Not on file     Food insecurity:     Worry: Not on file     Inability: Not on file     Transportation needs:     Medical: Not on file     Non-medical: Not on file   Tobacco Use     Smoking status: Never Smoker     Smokeless tobacco: Never Used   Substance and Sexual Activity     Alcohol use: Yes     Comment: socially - Weekends     Drug use: No     Sexual activity: Yes     Partners: Female     Birth control/protection: Condom   Lifestyle     Physical activity:     Days per week: Not on file     Minutes per session: Not on file     Stress: Not on file   Relationships     Social connections:     Talks on phone: Not on file     Gets together: Not on file     Attends Rastafari service: Not on file     Active member of club or organization: Not on file     Attends meetings of clubs or organizations: Not on file     Relationship status: Not on file     Intimate partner violence:     Fear of current or ex partner: Not on file     Emotionally abused: Not on file     Physically abused: Not on file     Forced sexual activity: Not on file   Other Topics Concern     Parent/sibling w/ CABG, MI or angioplasty before 65F 55M? No   Social History Narrative     Not on file       Family History  Family History   Problem Relation Age of Onset     Alcohol/Drug Mother      Heart Murmur Mother      Hypertension Father      Alcohol/Drug Paternal Grandmother      Anesthesia Reaction No family hx of      Cardiovascular No family hx of      Deep Vein Thrombosis No family hx of        ROS/MED HX  The complete review of systems is negative other  than noted in the HPI or here.  Patient denies recent illness, fever and respiratory infection during past month.  Pt denies steroid use during past year.    ENT/Pulmonary: Comment: URI over xmas. Has congestion upon awakening in AM that clears w/ cough.    (+), recent URI resolved . .   (-) tobacco use and asthma   Neurologic:  - neg neurologic ROS     Cardiovascular:  - neg cardiovascular ROS   (+) ----. : . . . :. . Previous cardiac testing date:results:date: results:ECG reviewed date:2008 results: date: results:          METS/Exercise Tolerance: Comment: Runs regularly in warmer weather, walks dog. Goes to [email or web address] 2-3x/week for weight lifting. >4 METS   Hematologic:  - neg hematologic  ROS      (-) History of Transfusion   Musculoskeletal:  - neg musculoskeletal ROS       GI/Hepatic:  - neg GI/hepatic ROS      (-) GERD   Renal/Genitourinary:  - ROS Renal section negative       Endo:  - neg endo ROS       Psychiatric:  - neg psychiatric ROS       Infectious Disease:  - neg infectious disease ROS       Malignancy:      - no malignancy   Other:    (+) no H/O Chronic Pain,             PHYSICAL EXAM:   Mental Status/Neuro: A/A/O; Age Appropriate   Airway: Facies: Feasible  Mallampati: II  Mouth/Opening: Full  TM distance: > 6 cm  Neck ROM: Full   Respiratory: Auscultation: CTAB     Resp. Rate: Normal     Resp. Effort: Normal      CV: Rhythm: Regular  Rate: Age appropriate  Heart: Normal Sounds  Edema: None   Comments:      Dental: Normal Dentition              Preop Vitals    BP Readings from Last 3 Encounters:   01/06/20 116/79   10/31/19 123/70   10/22/19 118/70    Pulse Readings from Last 3 Encounters:   01/06/20 74   10/31/9 73   10/22/19 80      Resp Readings from Last 3 Encounters:   01/06/20 12   06/06/11 16    SpO2 Readings from Last 3 Encounters:   01/06/20 99%   10/31/19 100%   10/22/19 99%      Temp Readings from Last 1 Encounters:   01/06/20 97.7  F (36.5  C) (Oral)    Ht Readings from Last 1 Encounters:  "  01/06/20 1.82 m (5' 11.65\")      Wt Readings from Last 1 Encounters:   01/06/20 82.1 kg (181 lb)    Estimated body mass index is 24.79 kg/m  as calculated from the following:    Height as of this encounter: 1.82 m (5' 11.65\").    Weight as of this encounter: 82.1 kg (181 lb).       Temp: 97.7  F (36.5  C) Temp src: Oral BP: 116/79 Pulse: 74   Resp: 12 SpO2: 99 %         181 lbs 0 oz  5' 11.654\"   Body mass index is 24.79 kg/m .    Physical Exam  Constitutional: Awake, alert, cooperative, no apparent distress, and appears stated age.  Eyes: Pupils equal, round and reactive to light, extra ocular muscles intact, sclera clear, conjunctiva normal.  HENT: Normocephalic, oral pharynx with moist mucus membranes, good dentition. No goiter appreciated. No removable dental hardware.  Respiratory: Clear to auscultation bilaterally, no crackles or wheezing. No SOB when supine.  Cardiovascular: Regular rate and rhythm, normal S1 and S2, and no murmur noted.  Carotids +2, no bruits. No edema. Palpable pulses to radial, DP and PT arteries.   GI: Normal bowel sounds, soft, non-distended, non-tender, no masses palpated, no hepatomegaly.    Lymph/Hematologic: No cervical lymphadenopathy and no supraclavicular lymphadenopathy.  Genitourinary:  deferred  Skin: Warm and dry.  No rashes at anticipated surgical site.   Musculoskeletal: full ROM of neck. There is no redness, warmth, or swelling of the joints. Gross motor strength is normal.    Neurologic: Awake, alert, oriented to name, place and time. Cranial nerves II-XII are grossly intact. Gait is normal. Ambulates from chair to exam table, seats self, lies supine and sits back up w/o assistance.  Neuropsychiatric: Calm, cooperative. Normal affect. Pleasant. Answers questions appropriately, follows commands w/o difficulty.    Labs today: (personally reviewed)  BMP, CBC, BNP    ASSESSMENT and PLAN  Abhishek Delatorre is a 44 year old male scheduled to undergo HERNIORRHAPHY, INGUINAL, " ROBOT-ASSISTED, WITH MESH, RIGHT with Shaggy Sevilla MD on 1/9/20 at [facility] for treatment of a Unilateral inguinal hernia.    Pt has had prior anesthetic. Type: GA - Had episode of hypotension postoperatively in 2008    Pt had left inguinal hernia repair under GA in 2008 and had post op hypotension, primarily orthostatic, which necessitated overnight stay.  No etiology was uncovered.  He was discharged the following morning without complication or follow up.    He has the following specific operative considerations:   # CHARLES 1/8 = low risk  # VTE risk: 0.26%  # Risk of PONV score = 1.  If > 2, anti-emetic intervention recommended.    # Anesthesia considerations:  Refer to PAC assessment in anesthesia records    CARDIAC: METS >4,  Runs regularly in warmer weather, walks dog. Goes to [facility] 2-3x/week for weight lifting.     # RCRI : No serious cardiac risks.  0.4% risk of major adverse cardiac event.     #  Will check BNP today    PULMONARY:     # Never smoked    # No asthma or inhaler use    GI: no GERD      ENDO: BMI 24   # No DM    ORTHO: Full neck ROM, no TMJ    Patient was discussed with Dr Castro. Patient is optimized and is acceptable candidate for the proposed procedure, provided labs today are within acceptable range. No further diagnostic evaluation is needed.    Arrival time, NPO, shower and medication instructions provided by nursing staff today.  Preparing For Your Surgery handout given.    Tiffanie Sanchez PA-C  Preoperative Assessment Center  [facility]  Clinic and Surgery Center  Phone: [phone]  Fax: [phone]

## 2020-01-09 ENCOUNTER — ANESTHESIA (OUTPATIENT)
Dept: SURGERY | Facility: CLINIC | Age: 45
End: 2020-01-09
Payer: COMMERCIAL

## 2020-01-09 ENCOUNTER — HOSPITAL ENCOUNTER (OUTPATIENT)
Facility: CLINIC | Age: 45
Discharge: HOME | End: 2020-01-09
Attending: SURGERY | Admitting: SURGERY
Payer: COMMERCIAL

## 2020-01-09 VITALS
TEMPERATURE: 98.2 F | BODY MASS INDEX: 24.63 KG/M2 | DIASTOLIC BLOOD PRESSURE: 83 MMHG | OXYGEN SATURATION: 98 % | HEIGHT: 71 IN | SYSTOLIC BLOOD PRESSURE: 131 MMHG | HEART RATE: 73 BPM | WEIGHT: 175.93 LBS | RESPIRATION RATE: 16 BRPM

## 2020-01-09 DIAGNOSIS — K40.90 UNILATERAL INGUINAL HERNIA WITHOUT OBSTRUCTION OR GANGRENE, RECURRENCE NOT SPECIFIED: ICD-10-CM

## 2020-01-09 LAB
GLUCOSE BLDC GLUCOMTR-MCNC: 82 MG/DL (ref 70–99)
GLUCOSE BLDC GLUCOMTR-MCNC: 96 MG/DL (ref 70–99)

## 2020-01-09 PROCEDURE — 25000566 ZZH SEVOFLURANE, EA 15 MIN: Performed by: SURGERY

## 2020-01-09 PROCEDURE — 36000088 ZZH SURGERY LEVEL 8 EA 15 ADDTL MIN - UMMC: Performed by: SURGERY

## 2020-01-09 PROCEDURE — 40000170 ZZH STATISTIC PRE-PROCEDURE ASSESSMENT II: Performed by: SURGERY

## 2020-01-09 PROCEDURE — 25800030 ZZH RX IP 258 OP 636: Performed by: STUDENT IN AN ORGANIZED HEALTH CARE EDUCATION/TRAINING PROGRAM

## 2020-01-09 PROCEDURE — C1781 MESH (IMPLANTABLE): HCPCS | Performed by: SURGERY

## 2020-01-09 PROCEDURE — 37000009 ZZH ANESTHESIA TECHNICAL FEE, EACH ADDTL 15 MIN: Performed by: SURGERY

## 2020-01-09 PROCEDURE — 82962 GLUCOSE BLOOD TEST: CPT

## 2020-01-09 PROCEDURE — 25000128 H RX IP 250 OP 636: Performed by: STUDENT IN AN ORGANIZED HEALTH CARE EDUCATION/TRAINING PROGRAM

## 2020-01-09 PROCEDURE — 25000128 H RX IP 250 OP 636: Performed by: SURGERY

## 2020-01-09 PROCEDURE — 25000125 ZZHC RX 250: Performed by: NURSE ANESTHETIST, CERTIFIED REGISTERED

## 2020-01-09 PROCEDURE — 71000027 ZZH RECOVERY PHASE 2 EACH 15 MINS: Performed by: SURGERY

## 2020-01-09 PROCEDURE — 71000015 ZZH RECOVERY PHASE 1 LEVEL 2 EA ADDTL HR: Performed by: SURGERY

## 2020-01-09 PROCEDURE — 25000128 H RX IP 250 OP 636: Performed by: NURSE ANESTHETIST, CERTIFIED REGISTERED

## 2020-01-09 PROCEDURE — 71000014 ZZH RECOVERY PHASE 1 LEVEL 2 FIRST HR: Performed by: SURGERY

## 2020-01-09 PROCEDURE — 36000086 ZZH SURGERY LEVEL 8 1ST 30 MIN UMMC: Performed by: SURGERY

## 2020-01-09 PROCEDURE — 27210794 ZZH OR GENERAL SUPPLY STERILE: Performed by: SURGERY

## 2020-01-09 PROCEDURE — 37000008 ZZH ANESTHESIA TECHNICAL FEE, 1ST 30 MIN: Performed by: SURGERY

## 2020-01-09 PROCEDURE — 25000132 ZZH RX MED GY IP 250 OP 250 PS 637: Performed by: STUDENT IN AN ORGANIZED HEALTH CARE EDUCATION/TRAINING PROGRAM

## 2020-01-09 DEVICE — MESH PROGRIP LAPAROSCOPIC 5.9X3.9" PARIETEX SELF-FIX LPG1510: Type: IMPLANTABLE DEVICE | Site: INGUINAL | Status: FUNCTIONAL

## 2020-01-09 RX ORDER — ACETAMINOPHEN 325 MG/1
975 TABLET ORAL ONCE
Status: COMPLETED | OUTPATIENT
Start: 2020-01-09 | End: 2020-01-09

## 2020-01-09 RX ORDER — LIDOCAINE HYDROCHLORIDE 20 MG/ML
INJECTION, SOLUTION INFILTRATION; PERINEURAL PRN
Status: DISCONTINUED | OUTPATIENT
Start: 2020-01-09 | End: 2020-01-09

## 2020-01-09 RX ORDER — SODIUM CHLORIDE, SODIUM LACTATE, POTASSIUM CHLORIDE, CALCIUM CHLORIDE 600; 310; 30; 20 MG/100ML; MG/100ML; MG/100ML; MG/100ML
INJECTION, SOLUTION INTRAVENOUS CONTINUOUS
Status: DISCONTINUED | OUTPATIENT
Start: 2020-01-09 | End: 2020-01-09 | Stop reason: HOSPADM

## 2020-01-09 RX ORDER — DEXAMETHASONE SODIUM PHOSPHATE 4 MG/ML
INJECTION, SOLUTION INTRA-ARTICULAR; INTRALESIONAL; INTRAMUSCULAR; INTRAVENOUS; SOFT TISSUE PRN
Status: DISCONTINUED | OUTPATIENT
Start: 2020-01-09 | End: 2020-01-09

## 2020-01-09 RX ORDER — FENTANYL CITRATE 50 UG/ML
25-50 INJECTION, SOLUTION INTRAMUSCULAR; INTRAVENOUS
Status: DISCONTINUED | OUTPATIENT
Start: 2020-01-09 | End: 2020-01-09 | Stop reason: HOSPADM

## 2020-01-09 RX ORDER — GABAPENTIN 300 MG/1
300 CAPSULE ORAL ONCE
Status: COMPLETED | OUTPATIENT
Start: 2020-01-09 | End: 2020-01-09

## 2020-01-09 RX ORDER — CEFAZOLIN SODIUM 1 G/3ML
1 INJECTION, POWDER, FOR SOLUTION INTRAMUSCULAR; INTRAVENOUS SEE ADMIN INSTRUCTIONS
Status: DISCONTINUED | OUTPATIENT
Start: 2020-01-09 | End: 2020-01-09 | Stop reason: HOSPADM

## 2020-01-09 RX ORDER — LIDOCAINE 40 MG/G
CREAM TOPICAL
Status: DISCONTINUED | OUTPATIENT
Start: 2020-01-09 | End: 2020-01-09 | Stop reason: HOSPADM

## 2020-01-09 RX ORDER — MEPERIDINE HYDROCHLORIDE 50 MG/ML
12.5 INJECTION INTRAMUSCULAR; INTRAVENOUS; SUBCUTANEOUS
Status: DISCONTINUED | OUTPATIENT
Start: 2020-01-09 | End: 2020-01-09 | Stop reason: HOSPADM

## 2020-01-09 RX ORDER — LABETALOL 20 MG/4 ML (5 MG/ML) INTRAVENOUS SYRINGE
10
Status: DISCONTINUED | OUTPATIENT
Start: 2020-01-09 | End: 2020-01-09 | Stop reason: HOSPADM

## 2020-01-09 RX ORDER — ONDANSETRON 4 MG/1
4 TABLET, ORALLY DISINTEGRATING ORAL EVERY 30 MIN PRN
Status: DISCONTINUED | OUTPATIENT
Start: 2020-01-09 | End: 2020-01-09 | Stop reason: HOSPADM

## 2020-01-09 RX ORDER — PROPOFOL 10 MG/ML
INJECTION, EMULSION INTRAVENOUS PRN
Status: DISCONTINUED | OUTPATIENT
Start: 2020-01-09 | End: 2020-01-09

## 2020-01-09 RX ORDER — CEFAZOLIN SODIUM 2 G/100ML
2 INJECTION, SOLUTION INTRAVENOUS
Status: DISCONTINUED | OUTPATIENT
Start: 2020-01-09 | End: 2020-01-09 | Stop reason: HOSPADM

## 2020-01-09 RX ORDER — ONDANSETRON 2 MG/ML
4 INJECTION INTRAMUSCULAR; INTRAVENOUS EVERY 30 MIN PRN
Status: DISCONTINUED | OUTPATIENT
Start: 2020-01-09 | End: 2020-01-09 | Stop reason: HOSPADM

## 2020-01-09 RX ORDER — HYDROCODONE BITARTRATE AND ACETAMINOPHEN 5; 325 MG/1; MG/1
1-2 TABLET ORAL EVERY 4 HOURS PRN
Qty: 15 TABLET | Refills: 0 | Status: SHIPPED | OUTPATIENT
Start: 2020-01-09 | End: 2021-06-28

## 2020-01-09 RX ORDER — AMOXICILLIN 250 MG
1-2 CAPSULE ORAL 2 TIMES DAILY
Qty: 30 TABLET | Refills: 0 | Status: SHIPPED | OUTPATIENT
Start: 2020-01-09 | End: 2021-06-28

## 2020-01-09 RX ORDER — HYDROMORPHONE HYDROCHLORIDE 1 MG/ML
.3-.5 INJECTION, SOLUTION INTRAMUSCULAR; INTRAVENOUS; SUBCUTANEOUS EVERY 5 MIN PRN
Status: DISCONTINUED | OUTPATIENT
Start: 2020-01-09 | End: 2020-01-09 | Stop reason: HOSPADM

## 2020-01-09 RX ORDER — FENTANYL CITRATE 50 UG/ML
INJECTION, SOLUTION INTRAMUSCULAR; INTRAVENOUS PRN
Status: DISCONTINUED | OUTPATIENT
Start: 2020-01-09 | End: 2020-01-09

## 2020-01-09 RX ORDER — ONDANSETRON 2 MG/ML
INJECTION INTRAMUSCULAR; INTRAVENOUS PRN
Status: DISCONTINUED | OUTPATIENT
Start: 2020-01-09 | End: 2020-01-09

## 2020-01-09 RX ORDER — NALOXONE HYDROCHLORIDE 0.4 MG/ML
.1-.4 INJECTION, SOLUTION INTRAMUSCULAR; INTRAVENOUS; SUBCUTANEOUS
Status: DISCONTINUED | OUTPATIENT
Start: 2020-01-09 | End: 2020-01-09 | Stop reason: HOSPADM

## 2020-01-09 RX ORDER — BUPIVACAINE HYDROCHLORIDE 5 MG/ML
INJECTION, SOLUTION EPIDURAL; INTRACAUDAL PRN
Status: DISCONTINUED | OUTPATIENT
Start: 2020-01-09 | End: 2020-01-09 | Stop reason: HOSPADM

## 2020-01-09 RX ADMIN — GABAPENTIN 300 MG: 300 CAPSULE ORAL at 13:10

## 2020-01-09 RX ADMIN — SODIUM CHLORIDE, POTASSIUM CHLORIDE, SODIUM LACTATE AND CALCIUM CHLORIDE: 600; 310; 30; 20 INJECTION, SOLUTION INTRAVENOUS at 18:04

## 2020-01-09 RX ADMIN — SUGAMMADEX 150 MG: 100 INJECTION, SOLUTION INTRAVENOUS at 17:28

## 2020-01-09 RX ADMIN — Medication 1 TABLET: at 19:51

## 2020-01-09 RX ADMIN — HYDROMORPHONE HYDROCHLORIDE 0.5 MG: 1 INJECTION, SOLUTION INTRAMUSCULAR; INTRAVENOUS; SUBCUTANEOUS at 18:49

## 2020-01-09 RX ADMIN — FENTANYL CITRATE 25 MCG: 50 INJECTION, SOLUTION INTRAMUSCULAR; INTRAVENOUS at 18:30

## 2020-01-09 RX ADMIN — FENTANYL CITRATE 25 MCG: 50 INJECTION, SOLUTION INTRAMUSCULAR; INTRAVENOUS at 18:08

## 2020-01-09 RX ADMIN — DEXAMETHASONE SODIUM PHOSPHATE 4 MG: 4 INJECTION, SOLUTION INTRA-ARTICULAR; INTRALESIONAL; INTRAMUSCULAR; INTRAVENOUS; SOFT TISSUE at 16:46

## 2020-01-09 RX ADMIN — FENTANYL CITRATE 50 MCG: 50 INJECTION, SOLUTION INTRAMUSCULAR; INTRAVENOUS at 17:29

## 2020-01-09 RX ADMIN — PROPOFOL 40 MG: 10 INJECTION, EMULSION INTRAVENOUS at 16:37

## 2020-01-09 RX ADMIN — FENTANYL CITRATE 25 MCG: 50 INJECTION, SOLUTION INTRAMUSCULAR; INTRAVENOUS at 18:19

## 2020-01-09 RX ADMIN — LIDOCAINE HYDROCHLORIDE 80 MG: 20 INJECTION, SOLUTION INFILTRATION; PERINEURAL at 16:33

## 2020-01-09 RX ADMIN — FENTANYL CITRATE 50 MCG: 50 INJECTION, SOLUTION INTRAMUSCULAR; INTRAVENOUS at 16:49

## 2020-01-09 RX ADMIN — MIDAZOLAM 2 MG: 1 INJECTION INTRAMUSCULAR; INTRAVENOUS at 16:25

## 2020-01-09 RX ADMIN — ONDANSETRON 4 MG: 2 INJECTION INTRAMUSCULAR; INTRAVENOUS at 17:26

## 2020-01-09 RX ADMIN — ROCURONIUM BROMIDE 50 MG: 10 INJECTION INTRAVENOUS at 16:33

## 2020-01-09 RX ADMIN — FENTANYL CITRATE 25 MCG: 50 INJECTION, SOLUTION INTRAMUSCULAR; INTRAVENOUS at 18:11

## 2020-01-09 RX ADMIN — FENTANYL CITRATE 50 MCG: 50 INJECTION, SOLUTION INTRAMUSCULAR; INTRAVENOUS at 16:33

## 2020-01-09 RX ADMIN — HYDROMORPHONE HYDROCHLORIDE 0.5 MG: 1 INJECTION, SOLUTION INTRAMUSCULAR; INTRAVENOUS; SUBCUTANEOUS at 18:35

## 2020-01-09 RX ADMIN — ONDANSETRON 4 MG: 2 INJECTION INTRAMUSCULAR; INTRAVENOUS at 18:06

## 2020-01-09 RX ADMIN — PROPOFOL 140 MG: 10 INJECTION, EMULSION INTRAVENOUS at 16:33

## 2020-01-09 RX ADMIN — ACETAMINOPHEN 975 MG: 325 TABLET, FILM COATED ORAL at 13:11

## 2020-01-09 ASSESSMENT — PAIN DESCRIPTION - DESCRIPTORS
DESCRIPTORS: PRESSURE
DESCRIPTORS: TENDER

## 2020-01-09 ASSESSMENT — MIFFLIN-ST. JEOR: SCORE: 1705.13

## 2020-01-09 NOTE — BRIEF OP NOTE
[facility] Brief Operative Note    Pre-operative diagnosis: Unilateral inguinal hernia without obstruction or gangrene, recurrence not specified [K40.90]   Post-operative diagnosis Same as Pre-op Dx   Procedure: Procedure(s):  HERNIORRHAPHY, INGUINAL, ROBOT-ASSISTED, WITH MESH, RIGHT   Surgeon: Shaggy Sevilla MD   Assistants(s):    Estimated blood loss: Minimal    Specimens: None   Findings: RIDH

## 2020-01-10 ENCOUNTER — PATIENT OUTREACH (OUTPATIENT)
Dept: SURGERY | Facility: CLINIC | Age: 45
End: 2020-01-10

## 2020-01-10 NOTE — ANESTHESIA POSTPROCEDURE EVALUATION
Anesthesia POST Procedure Evaluation    Patient: Abhishek Delatorre   MRN:     [id number] Gender:   male   Age:    44 year old :      1975        Preoperative Diagnosis: Unilateral inguinal hernia without obstruction or gangrene, recurrence not specified [K40.90]   Procedure(s):  HERNIORRHAPHY, INGUINAL, ROBOT-ASSISTED, WITH MESH, RIGHT   Postop Comments: No value filed.       Anesthesia Type:  Not documented  General    Reportable Event: NO     PAIN: Uncomplicated   Sign Out status: Comfortable, Well controlled pain     PONV: No PONV   Sign Out status:  No Nausea or Vomiting     Neuro/Psych: Uneventful perioperative course   Sign Out Status: Preoperative baseline; Age appropriate mentation     Airway/Resp.: Uneventful perioperative course   Sign Out Status: Non labored breathing, age appropriate RR; Resp. Status within EXPECTED Parameters     CV: Uneventful perioperative course   Sign Out status: Appropriate BP and perfusion indices; Appropriate HR/Rhythm     Disposition:   Sign Out in:  PACU  Disposition:  Phase II; Home  Recovery Course: Uneventful  Follow-Up: Not required           Last Anesthesia Record Vitals:  CRNA VITALS  2020 1706 - 2020 1805      2020             Resp Rate (set):  10          Last PACU Vitals:  Vitals Value Taken Time   /71 2020  6:00 PM   Temp     Pulse 68 2020  6:00 PM   Resp 12 2020  5:45 PM   SpO2 100 % 2020  6:03 PM   Temp src     NIBP     Pulse     SpO2     Resp     Temp     Ht Rate     Temp 2     Vitals shown include unvalidated device data.      Electronically Signed By: Florencio Castro MD, 2020, 6:05 PM

## 2020-01-10 NOTE — PROGRESS NOTES
Abhishek Delatorre is a patient of Dr. Roberts that underwent robotic inguinal hernia repair yesterday.  He is calling at this time with questions regarding a follow up appointment and OTC pain management.    All questions answered.  Plan to contact patient on 1/13 with routine post procedure call.

## 2020-01-10 NOTE — ADDENDUM NOTE
Addendum  created 01/09/20 1936 by Florencio Castro MD    Order list changed, Order sets accessed

## 2020-01-10 NOTE — DISCHARGE INSTRUCTIONS
[facility]  Same-Day Surgery   Adult Discharge Orders & Instructions     For 24 hours after surgery    1. Get plenty of rest.  A responsible adult must stay with you for at least 24 hours after you leave the hospital.   2. Do not drive or use heavy equipment.  If you have weakness or tingling, don't drive or use heavy equipment until this feeling goes away.  3. Do not drink alcohol.  4. Avoid strenuous or risky activities.  Ask for help when climbing stairs.   5. You may feel lightheaded.  IF so, sit for a few minutes before standing.  Have someone help you get up.   6. If you have nausea (feel sick to your stomach): Drink only clear liquids such as apple juice, ginger ale, broth or 7-Up.  Rest may also help.  Be sure to drink enough fluids.  Move to a regular diet as you feel able.  7. You may have a slight fever. Call the doctor if your fever is over 100 F (37.7 C) (taken under the tongue) or lasts longer than 24 hours.  8. You may have a dry mouth, a sore throat, muscle aches or trouble sleeping.  These should go away after 24 hours.  9. Do not make important or legal decisions.   Call your doctor for any of the followin.  Signs of infection (fever, growing tenderness at the surgery site, a large amount of drainage or bleeding, severe pain, foul-smelling drainage, redness, swelling).    2. It has been over 8 to 10 hours since surgery and you are still not able to urinate (pass water).    3.  Headache for over 24 hours.    To contact a doctor, call Dr. SUNNY Sevilla [facility] # [phone] or:        [phone] and ask for the resident on call for   General Surgery  (answered 24 hours a day)      Emergency Department:    [facility]: [phone]             Tips for taking pain medications  To get the best pain relief possible , remember these points:      Take pain medications as directed, before pain becomes severe      Pain medication can upset your stomach:  taking it with food may help      Constipation is a common side effect of pain medication. Drink plenty of  Fluids      Eat foods high in fiber. Take a stool softener  if recommended by your doctor or  Pharmacist.        Do not drink alcohol, drive or operate machinery while taking pain medications.      Ask about other ways to control pain, such as with heat, ice or relaxation.

## 2020-01-10 NOTE — OR NURSING
Pt and wife comfortable with his status at 2020,  Pt has voided, has ambulated, reports incisional areas tender with movement.  He has scrotal support with to take home.   Pt and wife are ready for discharge to home.  Vital signs are stable.

## 2020-01-10 NOTE — OP NOTE
Procedure Date: 01/09/2020      PREOPERATIVE DIAGNOSIS:  Right inguinal hernia.      POSTOPERATIVE DIAGNOSIS:  Right inguinal hernia.      OPERATIVE PROCEDURE:  Laparoscopic right inguinal hernioplasty, robot-assisted.      SURGEON:  Shaggy Sevilla MD.      ANESTHESIA:  General endotracheal.      INDICATIONS FOR PROCEDURE:  The patient presents with right inguinal hernia.  Informed consent was obtained.      OPERATIVE FINDINGS:  Right inguinal direct hernia.      OPERATIVE PROCEDURE:  The patient was brought to the operating room, put under general anesthetic.  Abdomen widely prepped and draped in the usual sterile fashion.  A supraumbilical skin incision was made.  Dissection carried down to rectus fascia, which was opened.  Rectus pushed out laterally.  Veress needle introduced, abdomen insufflated.  The Waldemar 8 port placed here.  An 8-port placed in the left and right lateral per routine technique under direct vision.  The mesh, which was a 15 x 10 cm Parietex ProGrip mesh, was rolled and secured per my routine technique, placed into the abdominal cavity, as was the 3-0 Stratafix.  At this point, the robot was docked.  Attention turned to the console, where the right preperitoneal space was entered per routine to take down the direct sac without difficulty.  The dissection was carried out medially to the previously placed mesh that was on the patient's left, and I then unrolled the mesh that I had placed and carried this up to a Veress needle that I used as a location marker.  The mesh was then unrolled to completely cover the right myopectineal orifice.  I then closed the peritoneal defect with a running 3-0 Stratafix.  The Veress was opened, thus decompressing the preperitoneal space, noting good placement of the mesh.  All ports were then removed after the needle was removed, the fascia closed with 0 Vicryl, skin with subcuticular. Steri-Strips were applied.  Estimated blood loss was minimal.  The patient  tolerated the procedure well and was taken to the recovery room, where he was without difficulty or apparent complication.         SOLA ASHLEY MD             D: 2020   T: 01/10/2020   MT:       Name:     JAYDA NAVA   MRN:      [id number]        Account:        [id number]   :      1975           Procedure Date: 2020      Document: [id number]

## 2020-01-10 NOTE — ADDENDUM NOTE
Addendum  created 01/10/20 1544 by Alexus Marrero APRN CRNA    Intraprocedure LDAs edited, LDA properties accepted

## 2020-01-10 NOTE — PROGRESS NOTES
Assigned as nurse during pt recovery   Tele strip printed and placed in written chart   Pt initially had nausea. Improved with Zofran 4mg   Pt denies any further nausea while in pacu   4 mg of Zofran also given in OR  Pt pain to incision/abdomen. Pain described as pressre mild- moderate. Medications given. Pain improved with interventions to a tolerable level (see mar and vs flow sheet for details)  Bare hugger applied   BG 96 @ 1810  Dr. Garcia @ bedside @ 1855 states she will place sign out   Pt tolerating ice chips and clear liquids without difficulty  Pt denies numbness, tingling, SOB, difficulty breathing, chest pressure/pain, dizziness, vision change, hearing change, ringing in ears, or other symptoms of discomfort at this time.,   Pt states he feels ready for phase II  Priya wife has picked up prescriptions   300 ml of LR given in PACU (informed pt got 1L in OR)  Report given to Ammy REN   In chart after pt transfer out of pacu to complete documentation

## 2020-01-10 NOTE — ANESTHESIA CARE TRANSFER NOTE
Patient: Abhishek Delatorre    Procedure(s):  HERNIORRHAPHY, INGUINAL, ROBOT-ASSISTED, WITH MESH, RIGHT    Diagnosis: Unilateral inguinal hernia without obstruction or gangrene, recurrence not specified [K40.90]  Diagnosis Additional Information: No value filed.    Anesthesia Type:   General     Note:  Airway :Nasal Cannula  Patient transferred to:PACU  Handoff Report: Identifed the Patient, Identified the Reponsible Provider, Reviewed the pertinent medical history, Discussed the surgical course, Reviewed Intra-OP anesthesia mangement and issues during anesthesia, Set expectations for post-procedure period and Allowed opportunity for questions and acknowledgement of understanding      Vitals: (Last set prior to Anesthesia Care Transfer)    CRNA VITALS  1/9/2020 1706 - 1/9/2020 1806      1/9/2020             Resp Rate (set):  10                Electronically Signed By: MARGE Real CRNA  January 9, 2020  6:11 PM

## 2020-01-10 NOTE — ANESTHESIA POSTPROCEDURE EVALUATION
Anesthesia POST Procedure Evaluation    Patient: Abhishek Delatorre   MRN:     [id number] Gender:   male   Age:    44 year old :      1975        Preoperative Diagnosis: Unilateral inguinal hernia without obstruction or gangrene, recurrence not specified [K40.90]   Procedure(s):  HERNIORRHAPHY, INGUINAL, ROBOT-ASSISTED, WITH MESH, RIGHT   Postop Comments: No value filed.       Anesthesia Type:  Not documented  General    Reportable Event: NO     PAIN: Uncomplicated   Sign Out status: Comfortable, Well controlled pain     PONV: No PONV   Sign Out status:  No Nausea or Vomiting     Neuro/Psych: Uneventful perioperative course   Sign Out Status: Preoperative baseline; Age appropriate mentation     Airway/Resp.: Uneventful perioperative course   Sign Out Status: Non labored breathing, age appropriate RR; Resp. Status within EXPECTED Parameters     CV: Uneventful perioperative course   Sign Out status: Appropriate BP and perfusion indices; Appropriate HR/Rhythm     Disposition:   Sign Out in:  PACU  Disposition:  Phase II; Home  Recovery Course: Uneventful  Follow-Up: Not required           Last Anesthesia Record Vitals:  CRNA VITALS  2020 1706 - 2020 1806      2020             Resp Rate (set):  10          Last PACU Vitals:  Vitals Value Taken Time   /72 2020  7:00 PM   Temp 37.3  C (99.1  F) 2020  7:00 PM   Pulse 73 2020  7:00 PM   Resp 12 2020  6:45 PM   SpO2 100 % 2020  7:01 PM   Temp src     NIBP     Pulse     SpO2     Resp     Temp     Ht Rate     Temp 2     Vitals shown include unvalidated device data.      Electronically Signed By: Alexus Garcia MD, 2020, 7:02 PM

## 2020-01-13 ENCOUNTER — PATIENT OUTREACH (OUTPATIENT)
Dept: SURGERY | Facility: CLINIC | Age: 45
End: 2020-01-13

## 2020-01-13 NOTE — PROGRESS NOTES
Abihshek Delatorre is a patient of Dr. Shaggy Sevilla that underwent robotic RIH approximately 4 days ago.  Attempted to contact patient via telephone for a status update and review post op teaching.  LM on VM to call office.  Await return call.      Of note:  Wound:  Steri-strips  Follow-up:  Routine  Restrictions:  - No activity restrictions  New medications:  Norco, Senna  Equipment/Supplies:  Athletic Supporter    ADDENDUM  01/13/20  10:16 AM  RN Post-Op/Post-Discharge Care Coordination Note    Spoke with Patient.    Support  Patient able to care for self independently     Health Status  Fevers/chills: Patient denies any fever or chills.  Nausea/Vomiting: Patient denies nausea/vomiting.  Eating/drinking: Patient is able to eat and drink without any complaints.  Bowel habits: Patient reports having a normal bowel movement.  Drains (APOLLO): N/A  Incisions: Patient denies any signs and symptoms of infection..  Wound closure:  Steri-strips  Pain: Improved. Patient is not taking any narcotics and has transitioned to OTC analgesics per package instructions    Activity/Restrictions  No restrictions    Equipment  Athletic Supporter    Pathology reviewed with patient:  N/A    Forms/Letters  No    All of his questions were answered including reviewing restrictions and wound care.  He will call this office if he has any further questions and/or concerns.      In lieu of a post-op clinic patient that patient would like to be contacted in approximately 7-10 days via telephone.    A copy of this note was routed to the primary surgeon.      Whom and When to Call  Patient acknowledges understanding of how to manage any medication changes and   when to seek medical care.     Patient advised that if after hour medical concerns arise to please call [phone] and choose option 4 to speak to the physician on call.

## 2020-01-21 ENCOUNTER — PATIENT OUTREACH (OUTPATIENT)
Dept: SURGERY | Facility: CLINIC | Age: 45
End: 2020-01-21

## 2020-01-21 NOTE — PROGRESS NOTES
Abhishek Delatorre is a patient of Dr. Shaggy Sevilla that recently underwent a surgical procedure (robotic RIH).  The patient was contacted via telephone approximately 7-10 days ago for a status update and post-op teaching.  In lieu of a clinic visit, the patient requested to be contacted at a later date by an RN for an assessment.    Attempted to contact patient via telephone for a status update.  LM on VM to call office.    ADDENDUM  01/21/20  11:24 AM  Abhishek Delatorre was contacted several days post procedure via telephone for a status update and elected to have a telephone follow -up call approximately 7-10 days after original contact in lieu of a clinic visit with Dr. Shaggy Sevilla.  He continues to do well and the steri-strips are starting to fall off.  The patients wounds are healed and the area is C/D/I.  He is afebrile, pain free, and hesham po; the patient is slowly resuming his normal activity.       Pathology was reviewed with the patient: Yes    All of Abhishek Delatorre question's were answered and  he would like to return to the clinic on a PRN basis.      A copy of this note was routed to the patients surgeon.

## 2020-12-06 ENCOUNTER — HEALTH MAINTENANCE LETTER (OUTPATIENT)
Age: 45
End: 2020-12-06

## 2021-06-28 ENCOUNTER — OFFICE VISIT (OUTPATIENT)
Dept: FAMILY MEDICINE | Facility: CLINIC | Age: 46
End: 2021-06-28
Payer: COMMERCIAL

## 2021-06-28 VITALS
SYSTOLIC BLOOD PRESSURE: 116 MMHG | HEIGHT: 71 IN | OXYGEN SATURATION: 99 % | DIASTOLIC BLOOD PRESSURE: 67 MMHG | HEART RATE: 64 BPM | TEMPERATURE: 98 F | WEIGHT: 177 LBS | BODY MASS INDEX: 24.78 KG/M2

## 2021-06-28 DIAGNOSIS — N50.89 ENLARGED TESTICLE: ICD-10-CM

## 2021-06-28 DIAGNOSIS — Z13.220 LIPID SCREENING: ICD-10-CM

## 2021-06-28 DIAGNOSIS — Z13.1 SCREENING FOR DIABETES MELLITUS: ICD-10-CM

## 2021-06-28 DIAGNOSIS — Z00.00 ROUTINE HISTORY AND PHYSICAL EXAMINATION OF ADULT: Primary | ICD-10-CM

## 2021-06-28 DIAGNOSIS — Z12.11 SPECIAL SCREENING FOR MALIGNANT NEOPLASMS, COLON: ICD-10-CM

## 2021-06-28 LAB
ERYTHROCYTE [DISTWIDTH] IN BLOOD BY AUTOMATED COUNT: 12.6 % (ref 10–15)
HBA1C MFR BLD: 4.8 % (ref 0–5.6)
HCT VFR BLD AUTO: 45.8 % (ref 40–53)
HGB BLD-MCNC: 15.6 G/DL (ref 13.3–17.7)
MCH RBC QN AUTO: 32.3 PG (ref 26.5–33)
MCHC RBC AUTO-ENTMCNC: 34.1 G/DL (ref 31.5–36.5)
MCV RBC AUTO: 95 FL (ref 78–100)
PLATELET # BLD AUTO: 242 10E9/L (ref 150–450)
RBC # BLD AUTO: 4.83 10E12/L (ref 4.4–5.9)
WBC # BLD AUTO: 7.4 10E9/L (ref 4–11)

## 2021-06-28 PROCEDURE — 99213 OFFICE O/P EST LOW 20 MIN: CPT | Mod: 25 | Performed by: INTERNAL MEDICINE

## 2021-06-28 PROCEDURE — 36415 COLL VENOUS BLD VENIPUNCTURE: CPT | Performed by: INTERNAL MEDICINE

## 2021-06-28 PROCEDURE — 80061 LIPID PANEL: CPT | Performed by: INTERNAL MEDICINE

## 2021-06-28 PROCEDURE — 85027 COMPLETE CBC AUTOMATED: CPT | Performed by: INTERNAL MEDICINE

## 2021-06-28 PROCEDURE — 83036 HEMOGLOBIN GLYCOSYLATED A1C: CPT | Performed by: INTERNAL MEDICINE

## 2021-06-28 PROCEDURE — 80053 COMPREHEN METABOLIC PANEL: CPT | Performed by: INTERNAL MEDICINE

## 2021-06-28 PROCEDURE — 99386 PREV VISIT NEW AGE 40-64: CPT | Performed by: INTERNAL MEDICINE

## 2021-06-28 ASSESSMENT — ENCOUNTER SYMPTOMS
CHILLS: 0
WEAKNESS: 0
DYSURIA: 0
CONSTIPATION: 0
PARESTHESIAS: 0
HEARTBURN: 0
MYALGIAS: 0
HEMATOCHEZIA: 0
COUGH: 0
SORE THROAT: 0
JOINT SWELLING: 0
DIZZINESS: 0
HEADACHES: 0
HEMATURIA: 0
PALPITATIONS: 0
ARTHRALGIAS: 0
FEVER: 0
DIARRHEA: 0
FREQUENCY: 0
ABDOMINAL PAIN: 0
SHORTNESS OF BREATH: 0
NAUSEA: 0
NERVOUS/ANXIOUS: 0
EYE PAIN: 0

## 2021-06-28 ASSESSMENT — MIFFLIN-ST. JEOR: SCORE: 1705

## 2021-06-29 LAB
ALBUMIN SERPL-MCNC: 4.2 G/DL (ref 3.4–5)
ALP SERPL-CCNC: 65 U/L (ref 40–150)
ALT SERPL W P-5'-P-CCNC: 24 U/L (ref 0–70)
ANION GAP SERPL CALCULATED.3IONS-SCNC: 1 MMOL/L (ref 3–14)
AST SERPL W P-5'-P-CCNC: 24 U/L (ref 0–45)
BILIRUB SERPL-MCNC: 0.6 MG/DL (ref 0.2–1.3)
BUN SERPL-MCNC: 12 MG/DL (ref 7–30)
CALCIUM SERPL-MCNC: 8.7 MG/DL (ref 8.5–10.1)
CHLORIDE SERPL-SCNC: 108 MMOL/L (ref 94–109)
CHOLEST SERPL-MCNC: 144 MG/DL
CO2 SERPL-SCNC: 31 MMOL/L (ref 20–32)
CREAT SERPL-MCNC: 0.91 MG/DL (ref 0.66–1.25)
GFR SERPL CREATININE-BSD FRML MDRD: >90 ML/MIN/{1.73_M2}
GLUCOSE SERPL-MCNC: 90 MG/DL (ref 70–99)
HDLC SERPL-MCNC: 63 MG/DL
LDLC SERPL CALC-MCNC: 67 MG/DL
NONHDLC SERPL-MCNC: 81 MG/DL
POTASSIUM SERPL-SCNC: 3.9 MMOL/L (ref 3.4–5.3)
PROT SERPL-MCNC: 7.8 G/DL (ref 6.8–8.8)
SODIUM SERPL-SCNC: 140 MMOL/L (ref 133–144)
TRIGL SERPL-MCNC: 69 MG/DL

## 2021-06-30 ENCOUNTER — ANCILLARY PROCEDURE (OUTPATIENT)
Dept: ULTRASOUND IMAGING | Facility: CLINIC | Age: 46
End: 2021-06-30
Attending: INTERNAL MEDICINE
Payer: COMMERCIAL

## 2021-06-30 DIAGNOSIS — N50.89 ENLARGED TESTICLE: ICD-10-CM

## 2021-06-30 PROCEDURE — 93976 VASCULAR STUDY: CPT | Performed by: RADIOLOGY

## 2021-06-30 PROCEDURE — 76870 US EXAM SCROTUM: CPT | Performed by: RADIOLOGY

## 2021-08-10 ENCOUNTER — TRANSFERRED RECORDS (OUTPATIENT)
Dept: HEALTH INFORMATION MANAGEMENT | Facility: CLINIC | Age: 46
End: 2021-08-10

## 2021-09-25 ENCOUNTER — HEALTH MAINTENANCE LETTER (OUTPATIENT)
Age: 46
End: 2021-09-25

## 2022-04-14 NOTE — PROGRESS NOTES
"Nurse Note      Itinerary:  [facility], [facility], [employer]      Departure Date: 6/26/2022      Return Date: 7/12/2022      Length of Trip 16 days      Reason for Travel: Tourism           Urban or rural: urban      Accommodations: Hotel        IMMUNIZATION HISTORY  Have you received any immunizations within the past 4 weeks?  No  Have you ever fainted from having your blood drawn or from an injection?  Yes  Have you ever had a fever reaction to vaccination?  ?  Have you ever had any bad reaction or side effect from any vaccination?  ?  Have you ever had hepatitis A or B vaccine?  Yes  Do you live (or work closely) with anyone who has AIDS, an AIDS-like condition, any other immune disorder or who is on chemotherapy for cancer?  No  Do you have a family history of immunodeficiency?  No  Have you received any injection of immune globulin or any blood products during the past 12 months?  No    Patient roomed by Tiarra Delatorre is a 46 year old male seen today with family ( spouse and 2 children)  for counsultation for international travel.   Patient will be departing in  2 month(s)     Patient itinerary :  will be in the [facility]  which risk for Malaria, food borne illnesses, motor vehicle accidents, Typhoid and Covid. exposure.      Patient's activities will include sightseeing and [employer].    Patient's country [place] birth is [place]    Special medical concerns: none  Pre-travel questionnaire was completed by patient and reviewed by provider.     Vitals: /68   Pulse 69   Temp 98  F (36.7  C)   Ht 1.803 m (5' 11\")   Wt 81.4 kg (179 lb 8 oz)   SpO2 98%   BMI 25.04 kg/m    BMI= Body mass index is 25.04 kg/m .    EXAM:  General:  Well-nourished, well-developed in no acute distress.  Appears to be stated age, interacts appropriately and expresses understanding of information given to patient.    Current Outpatient Medications "   Medication Sig Dispense Refill     atovaquone-proguanil (MALARONE) 250-100 MG tablet Take 1 tablet by mouth daily Start 2 days before exposure to Malaria and continue daily till  7 days after exposure. 15 tablet 0     azithromycin (ZITHROMAX) 500 MG tablet Take 1 tablet (500 mg) by mouth daily for 3 doses Take 1 tablet a day for up to 3 days for severe diarrhea 3 tablet 0     Eyelid Cleansers (OCUSOFT EYELID CLEANSING EX) Externally apply 1 Application topically 2 times daily       Patient Active Problem List   Diagnosis     CARDIOVASCULAR SCREENING; LDL GOAL LESS THAN 160     Unilateral inguinal hernia without obstruction or gangrene, recurrence not specified     Blepharitis of left eye     Allergies   Allergen Reactions     Sulfa Drugs      Hives and fatigue         Immunizations discussed include:   Covid 19: Up to date  Hepatitis A: Twin Jose completed today  Hepatitis B:Twin Jose completed today  Influenza: Up to date  Typhoid: Ordered/given today, risks, benefits and side effects reviewed  Rabies: Declined  reviewed managment of a animal bite or scratch (washing wound, seek medical care within 24 hours for post exposure prophylaxis )  Yellow Fever: Not indicated  Japanese Encephalitis: Not indicated  Meningococcus: Not indicated  Tetanus/Diphtheria: Up to date  Measles/Mumps/Rubella: Up to date  Cholera: Not needed  Polio: Not indicated  Pneumococcal: Under age of 65  Varicella: Immune by disease history per patient report  Shingrix: Not indicated  HPV:  Not indicated     TB: low risk travel   *    ASSESSMENT/PLAN:  Abhishek was seen today for travel clinic.    Diagnoses and all orders for this visit:    Travel advice encounter  -     atovaquone-proguanil (MALARONE) 250-100 MG tablet; Take 1 tablet by mouth daily Start 2 days before exposure to Malaria and continue daily till  7 days after exposure.  -     azithromycin (ZITHROMAX) 500 MG tablet; Take 1 tablet (500 mg) by mouth daily for 3 doses Take 1 tablet a day  for up to 3 days for severe diarrhea    Other orders  -     HEP A-HEP B  -     TYPHOID VACCINE, IM      I have reviewed general recommendations for safe travel   including: food/water precautions (we reviewed exact timing for Malaria risk and when to start/finish antimalarials), insect precautions, STEP sign up  roadway safety. Educational materials and [employer] report provided.    Malaraia prophylaxis recommended: Malarone  Symptomatic treatment for traveler's diarrhea: azithromycin    Personal protective measures reviewed including hand sanitizing and contact precautions for the prevention of viral illnesses. Cover coughs and masking  during travel and upon return.  Current COVID 19 pandemic.   Monitor / follow current CDC guidelines.    Country specific and CDC Covid 19  testing requirements and resources given to patient.    Evacuation insurance advised and resources were provided to patient.    Total visit time 15 minutes  with over 50% of time spent counseling patient as detailed above.    Gracie Chauhan CNP  Certificate in Travel Health

## 2022-04-21 ENCOUNTER — OFFICE VISIT (OUTPATIENT)
Dept: FAMILY MEDICINE | Facility: CLINIC | Age: 47
End: 2022-04-21
Payer: COMMERCIAL

## 2022-04-21 VITALS
OXYGEN SATURATION: 98 % | BODY MASS INDEX: 25.13 KG/M2 | HEIGHT: 71 IN | SYSTOLIC BLOOD PRESSURE: 124 MMHG | HEART RATE: 69 BPM | WEIGHT: 179.5 LBS | DIASTOLIC BLOOD PRESSURE: 68 MMHG | TEMPERATURE: 98 F

## 2022-04-21 DIAGNOSIS — Z71.84 TRAVEL ADVICE ENCOUNTER: Primary | ICD-10-CM

## 2022-04-21 PROCEDURE — 90472 IMMUNIZATION ADMIN EACH ADD: CPT | Mod: GA | Performed by: NURSE PRACTITIONER

## 2022-04-21 PROCEDURE — 90471 IMMUNIZATION ADMIN: CPT | Mod: GA | Performed by: NURSE PRACTITIONER

## 2022-04-21 PROCEDURE — 90636 HEP A/HEP B VACC ADULT IM: CPT | Mod: GA | Performed by: NURSE PRACTITIONER

## 2022-04-21 PROCEDURE — 90691 TYPHOID VACCINE IM: CPT | Mod: GA | Performed by: NURSE PRACTITIONER

## 2022-04-21 PROCEDURE — 99401 PREV MED CNSL INDIV APPRX 15: CPT | Mod: 25 | Performed by: NURSE PRACTITIONER

## 2022-04-21 RX ORDER — ATOVAQUONE AND PROGUANIL HYDROCHLORIDE 250; 100 MG/1; MG/1
1 TABLET, FILM COATED ORAL DAILY
Qty: 15 TABLET | Refills: 0 | Status: SHIPPED | OUTPATIENT
Start: 2022-04-21 | End: 2022-12-09

## 2022-04-21 RX ORDER — AZITHROMYCIN 500 MG/1
500 TABLET, FILM COATED ORAL DAILY
Qty: 3 TABLET | Refills: 0 | Status: SHIPPED | OUTPATIENT
Start: 2022-04-21 | End: 2022-04-24

## 2022-04-21 NOTE — PATIENT INSTRUCTIONS
Thank you for visiting the [facility] : [phone]  Today April 21, 2022 you received the    Twinrix (Hepatitis A & B combo) Vaccine -     Typhoid - injectable. This vaccine is valid for two years.     Follow up vaccine appointments can be made as a NURSE ONLY visit at the Travel Clinic, (BE PREPARED TO WAIT, ) or at designated [facility].    If you are receiving the Rabies vaccines series, it is important that you follow the exact schedule ordered.     Pre-travel     We recommend that you purchase Medical Evacuation Insurance prior to your departure.  Https://wwwnc.cdc.gov/travel/page/insurance    [place] your travel plans with the [place] Department of State through STEP ( Smart Traveler Enrollment Program ) https://step.state.gov.  STEP is a free service to allow [place] citizens and nationals traveling and living abroad to enroll their trip with the nearest [place] Embassy or Consulate.    Animal Exposure: Avoid all mammals even if they look healthy.  If there is a bite, scratch or even a lick, wash area immediately with soap and water for 15 minutes and seek medical care within 24 hours for evaluation of Rabies post exposure treatment.  Contact your Medical Evacuation Insurance.    COVID 19 (Sars Cov2) prevention strategies  Physical distancing: Maintain 6 foot (2m) from others.              Avoid large gatherings and public transportation.   Avoid indoor shopping malls, theaters and restaurants   Practice consistent mask wearing covering the nose, mouth and underneath the chin when unable to maintain 6 foot distance from others.  Hand washing: frequent, thorough handwashing with soap and water for 20 seconds (or using a hand  containing 60% alcohol)   Avoid touching face, nose, eyes, mouth unless you have done appropriate hand washing as above.   Clean high touch surfaces with approved disinfectant against Covid 19  (70% Ethanol ) or a bleach solution (add 20  mL (4 teaspoons) of bleach to 1 L (1 quart) of water;)  Be careful not to breath or touch bleach.      Travel Covid 19 Testing:  updated 12/06/2021  International travelers: Pre-travel: diagnostic testing (antigen or PCR) may be required for entry:  See country specific Embassy websites or airline websites.    US ENTRY Requirements: Effective December 6, 2021, all international arrivals to the [place] (regardless of vaccination status or citizenship status) by air are required to have a negative predeparture COVID-19 result from a test taken no more than 1 calendar day prior to departure of the flight to the [place]. Many complex scenarios may result from the 1-day rule. For example, for a flight that arrives in the [place] on a Monday, the test must have been taken no earlier than Sunday local time in the departure city. If the itinerary contains multiple flights, the test can be taken 1 day prior to departure of the first flight or can be taken en route, as long as the connecting airport is not in the [place]. If the test is unable to be taken en route, the traveler will not be able to enter the [place], or if the test is taken en route and is positive, the traveler will have to isolate in that location. If the itinerary contains 1 or more overnight stays en route to the [place], the test must have been taken 1 calendar day before the flight that will enter the [place]; however, if the itinerary has an overnight connection due to limitations in flight availability, retesting will not be required. If the first flight within an itinerary is delayed due to severe weather, aircraft mechanical issues, or other issues outside of the traveler's control, the traveler will only need to be retested if the delay causes the original test to be 24 hours or more past the 1-day window. If a connecting flight is delayed due to any of the above issues, the traveler will only need to be retested if the delay causes the original test to be 48 hours or more past the  1-day window. If a trip of less than 1 day is made out the [place], a viral test taken in the [place] may be used as a predeparture test as long as the test was taken no more than 1 day prior to rearrival in the [place]; however, if a delay occurs on the return trip and the predeparture test is out of the 1-day window, the traveler will need to be retested before returning to the [place]. Noncitizen nonimmigrants who are unvaccinated remain banned from entry    Post travel: CDC recommends getting tested 3-5 days after your trip AND stay home and self-quarantine for 7 days.      COVID-19 testing scheduling number for pre-travel through [facility]  [phone] (Must have an order). Available 24 hours a day.  You can also schedule through My Chart.     Post-travel illness:  Contact your provider or [facility] if you develop a fever, rash, cough, diarrhea or other symptoms for up to 1 year after travel.  Inform your healthcare provider when and where you traveled to.    Please call the [facility] with any questions [phone]  Or send your provider a 'My Chart' note.

## 2022-08-27 ENCOUNTER — HEALTH MAINTENANCE LETTER (OUTPATIENT)
Age: 47
End: 2022-08-27

## 2022-12-09 ENCOUNTER — OFFICE VISIT (OUTPATIENT)
Dept: FAMILY MEDICINE | Facility: CLINIC | Age: 47
End: 2022-12-09
Payer: COMMERCIAL

## 2022-12-09 VITALS
DIASTOLIC BLOOD PRESSURE: 88 MMHG | TEMPERATURE: 97.7 F | HEART RATE: 70 BPM | BODY MASS INDEX: 25.09 KG/M2 | WEIGHT: 179.2 LBS | HEIGHT: 71 IN | RESPIRATION RATE: 16 BRPM | OXYGEN SATURATION: 100 % | SYSTOLIC BLOOD PRESSURE: 133 MMHG

## 2022-12-09 DIAGNOSIS — J01.00 ACUTE NON-RECURRENT MAXILLARY SINUSITIS: ICD-10-CM

## 2022-12-09 DIAGNOSIS — R03.0 ELEVATED BP WITHOUT DIAGNOSIS OF HYPERTENSION: ICD-10-CM

## 2022-12-09 DIAGNOSIS — J06.9 UPPER RESPIRATORY TRACT INFECTION, UNSPECIFIED TYPE: Primary | ICD-10-CM

## 2022-12-09 PROBLEM — Z11.59 NEED FOR HEPATITIS C SCREENING TEST: Status: RESOLVED | Noted: 2022-12-09 | Resolved: 2022-12-09

## 2022-12-09 PROBLEM — Z11.59 NEED FOR HEPATITIS C SCREENING TEST: Status: ACTIVE | Noted: 2022-12-09

## 2022-12-09 PROBLEM — Z11.4 SCREENING FOR HIV (HUMAN IMMUNODEFICIENCY VIRUS): Status: ACTIVE | Noted: 2022-12-09

## 2022-12-09 PROCEDURE — 99214 OFFICE O/P EST MOD 30 MIN: CPT | Performed by: INTERNAL MEDICINE

## 2022-12-09 ASSESSMENT — PAIN SCALES - GENERAL: PAINLEVEL: NO PAIN (0)

## 2022-12-09 NOTE — PATIENT INSTRUCTIONS
Continue to drink lots of fluids, use nasal saline spray.     Continue symptomatic treatment. You can use Claritin D or Zyrtec D for 2 to 3 days.    If you feel no better after 2 to 3 days of symptomatic treatment, I've provided a backup antibiotic (augmentin), which you will take for 7 days.     Monitor your blood pressure once a week  at home.  Bring those readings on your next visit.  Notify us if your blood pressure readings consistently stays greater than 140/90.     Follow up with your PCP in 2-3 months    Seek sooner medical attention if there is any worsening of symptoms or problems.

## 2022-12-09 NOTE — PROGRESS NOTES
Abhishek was seen today for sinus problem.    Diagnoses and all orders for this visit:    Upper respiratory tract infection, unspecified type complicated by sinusitis    The patient is feeling lingering congestion in his sinuses and has clear nasal secretions after developing flu-like symptoms about 1 month ago.   I instructed the patient to Drink plenty of fluids, take extra rest, use saline nasal spray, and use symptomatic treatment like Claritin D or Zyrtec D for 2 to 3 days before filling below prescription.  -     amoxicillin-clavulanate (AUGMENTIN) 875-125 MG tablet; Take 1 tablet by mouth 2 times daily    Acute non-recurrent maxillary sinusitis  -     amoxicillin-clavulanate (AUGMENTIN) 875-125 MG tablet; Take 1 tablet by mouth 2 times daily given as back up    Elevated BP without diagnosis of hypertension   Elevated BP before the visit. This was re-checked, and BP dropped. I instructed patient to monitor blood pressure and report readings to his primary doctor at his next physical.  Discussed the importance of keeping BP under good control to reduce the risk of heart attack and stroke.      Other orders  -     REVIEW OF HEALTH MAINTENANCE PROTOCOL ORDERS         Subjective   Abhishek is a 46 year old, presenting for the following health issues:  Sinus Problem    Patient developed flu-like symptoms about 1 month ago, but got better after a couple of weeks. His daughter had the flu around that time. Now he is feeling some lingering congestion, stuffiness in the ears, and it is worse in the mornings. COVID test was negative. He is planning on traveling for the holidays beginning 12/21. He has taken generic mucinex. Secretions are clear. He has a mild cough. He feels a pressure near his nose when he bends his head down. He is up to date with vaccinations and is otherwise healthy.     Sinus Problem     History of Present Illness       Reason for visit:  Persistent congestion, primarily in nasal passages  Symptom onset:  " 3-4 weeks ago  Symptoms include:  Head congestion (most acute in the morning), feeling of muted hearing (like water in ears after swimming), slight dizziness (12/8/2022), mild and occasional cough  Symptom intensity:  Mild  Symptom progression:  Staying the same  Had these symptoms before:  No  What makes it better:  Mucinex to ease congestion    He eats 4 or more servings of fruits and vegetables daily.He consumes 0 sweetened beverage(s) daily.He exercises with enough effort to increase his heart rate 30 to 60 minutes per day.  He exercises with enough effort to increase his heart rate 7 days per week.   He is taking medications regularly.             Review of Systems   Constitutional, HEENT, cardiovascular, pulmonary, GI, , musculoskeletal, neuro, skin, endocrine and psych systems are negative, except as otherwise noted.      Objective    /88 (BP Location: Right arm, Patient Position: Sitting)   Pulse 70   Temp 97.7  F (36.5  C) (Oral)   Resp 16   Ht 1.803 m (5' 10.98\")   Wt 81.3 kg (179 lb 3.2 oz)   SpO2 100%   BMI 25.00 kg/m    Body mass index is 25 kg/m .  Physical Exam   GENERAL: healthy, alert and no distress  EYES: Eyes grossly normal to inspection, PERRL and conjunctivae and sclerae normal  HENT: Slight bulging of left TM without redness. Ear canals normal, nose is mildly congested  and mouth without ulcers or lesions, a  NECK: no adenopathy, no asymmetry, masses, or scars and thyroid normal to palpation  RESP: lungs clear to auscultation - no rales, rhonchi or wheezes  CV: regular rate and rhythm, normal S1 S2, no S3 or S4, no murmur, click or rub, no peripheral edema and peripheral pulses strong            This document serves as a record of the services and decisions personally performed and made by Dr. Wagner. It was created on his behalf by Paola Garcia, a trained medical scribe. The creation of this document is based the provider's statements to the medical scribe.            "

## 2023-04-11 ENCOUNTER — OFFICE VISIT (OUTPATIENT)
Dept: URGENT CARE | Facility: URGENT CARE | Age: 48
End: 2023-04-11
Payer: COMMERCIAL

## 2023-04-11 VITALS
OXYGEN SATURATION: 98 % | TEMPERATURE: 98.1 F | WEIGHT: 180 LBS | BODY MASS INDEX: 25.12 KG/M2 | SYSTOLIC BLOOD PRESSURE: 130 MMHG | HEART RATE: 77 BPM | DIASTOLIC BLOOD PRESSURE: 80 MMHG

## 2023-04-11 DIAGNOSIS — R19.7 DIARRHEA, UNSPECIFIED TYPE: Primary | ICD-10-CM

## 2023-04-11 LAB
ALBUMIN SERPL BCG-MCNC: 4.5 G/DL (ref 3.5–5.2)
ALP SERPL-CCNC: 56 U/L (ref 40–129)
ALT SERPL W P-5'-P-CCNC: 17 U/L (ref 10–50)
ANION GAP SERPL CALCULATED.3IONS-SCNC: 13 MMOL/L (ref 7–15)
AST SERPL W P-5'-P-CCNC: 29 U/L (ref 10–50)
BASOPHILS # BLD AUTO: 0 10E3/UL (ref 0–0.2)
BASOPHILS NFR BLD AUTO: 1 %
BILIRUB SERPL-MCNC: 0.9 MG/DL
BUN SERPL-MCNC: 9.5 MG/DL (ref 6–20)
CALCIUM SERPL-MCNC: 9.3 MG/DL (ref 8.6–10)
CHLORIDE SERPL-SCNC: 104 MMOL/L (ref 98–107)
CREAT SERPL-MCNC: 0.83 MG/DL (ref 0.67–1.17)
DEPRECATED HCO3 PLAS-SCNC: 24 MMOL/L (ref 22–29)
EOSINOPHIL # BLD AUTO: 0.1 10E3/UL (ref 0–0.7)
EOSINOPHIL NFR BLD AUTO: 1 %
ERYTHROCYTE [DISTWIDTH] IN BLOOD BY AUTOMATED COUNT: 11.7 % (ref 10–15)
GFR SERPL CREATININE-BSD FRML MDRD: >90 ML/MIN/1.73M2
GLUCOSE SERPL-MCNC: 87 MG/DL (ref 70–99)
HCT VFR BLD AUTO: 46.5 % (ref 40–53)
HGB BLD-MCNC: 15.4 G/DL (ref 13.3–17.7)
IMM GRANULOCYTES # BLD: 0 10E3/UL
IMM GRANULOCYTES NFR BLD: 0 %
LYMPHOCYTES # BLD AUTO: 1.7 10E3/UL (ref 0.8–5.3)
LYMPHOCYTES NFR BLD AUTO: 27 %
MCH RBC QN AUTO: 31.2 PG (ref 26.5–33)
MCHC RBC AUTO-ENTMCNC: 33.1 G/DL (ref 31.5–36.5)
MCV RBC AUTO: 94 FL (ref 78–100)
MONOCYTES # BLD AUTO: 0.4 10E3/UL (ref 0–1.3)
MONOCYTES NFR BLD AUTO: 7 %
NEUTROPHILS # BLD AUTO: 4.1 10E3/UL (ref 1.6–8.3)
NEUTROPHILS NFR BLD AUTO: 65 %
PLATELET # BLD AUTO: 294 10E3/UL (ref 150–450)
POTASSIUM SERPL-SCNC: 4 MMOL/L (ref 3.4–5.3)
PROT SERPL-MCNC: 7.2 G/DL (ref 6.4–8.3)
RBC # BLD AUTO: 4.93 10E6/UL (ref 4.4–5.9)
SODIUM SERPL-SCNC: 141 MMOL/L (ref 136–145)
WBC # BLD AUTO: 6.3 10E3/UL (ref 4–11)

## 2023-04-11 PROCEDURE — 36415 COLL VENOUS BLD VENIPUNCTURE: CPT | Performed by: PHYSICIAN ASSISTANT

## 2023-04-11 PROCEDURE — 85025 COMPLETE CBC W/AUTO DIFF WBC: CPT | Performed by: PHYSICIAN ASSISTANT

## 2023-04-11 PROCEDURE — 99214 OFFICE O/P EST MOD 30 MIN: CPT | Performed by: PHYSICIAN ASSISTANT

## 2023-04-11 PROCEDURE — 80053 COMPREHEN METABOLIC PANEL: CPT | Performed by: PHYSICIAN ASSISTANT

## 2023-04-11 ASSESSMENT — ENCOUNTER SYMPTOMS
NAUSEA: 1
CONSTIPATION: 0
DIARRHEA: 1
SHORTNESS OF BREATH: 0
APPETITE CHANGE: 1
FEVER: 0
VOMITING: 0
BLOOD IN STOOL: 0

## 2023-04-11 NOTE — PROGRESS NOTES
SUBJECTIVE:   Abhishek Delatorre is a 47 year old male presenting with a chief complaint of   Chief Complaint   Patient presents with     Diarrhea     Ongoing Saturday night        He is an established patient of [facility].  Patient presents with 3 days of diarrhea.  6-8 episodes of diarrhea per day.  No blood.  No recent abx.  No hx of c.diff.  Recently back from [place].  Abdominal cramping.  No known intestinal issues - colonoscopy 2 years ago and normal.  Last urinated PTA.  Seems improved today, no diarrhea today.  Diet normal.    Treatment:  Imodium    Review of Systems   Constitutional: Positive for appetite change. Negative for fever.   Respiratory: Negative for shortness of breath.    Cardiovascular: Negative for chest pain.   Gastrointestinal: Positive for diarrhea and nausea. Negative for blood in stool, constipation and vomiting.   Skin: Negative for rash.   All other systems reviewed and are negative.      No past medical history on file.  Family History   Problem Relation Age of Onset     Alcohol/Drug Mother      Heart Murmur Mother      Substance Abuse Mother      Hypertension Father      Alcohol/Drug Paternal Grandmother      Anesthesia Reaction No family hx of      Cardiovascular No family hx of      Deep Vein Thrombosis No family hx of      Current Outpatient Medications   Medication Sig Dispense Refill     Eyelid Cleansers (OCUSOFT EYELID CLEANSING EX) Externally apply 1 Application topically 2 times daily       Social History     Tobacco Use     Smoking status: Never     Smokeless tobacco: Never   Vaping Use     Vaping status: Never Used     Passive vaping exposure: Yes   Substance Use Topics     Alcohol use: Yes     Comment: socially - Weekends       OBJECTIVE  /80   Pulse 77   Temp 98.1  F (36.7  C) (Tympanic)   Wt 81.6 kg (180 lb)   SpO2 98%   BMI 25.12 kg/m      Physical Exam  Vitals and nursing note reviewed.   Constitutional:       Appearance: Normal appearance. He is normal weight.    Eyes:      Extraocular Movements: Extraocular movements intact.      Conjunctiva/sclera: Conjunctivae normal.   Cardiovascular:      Rate and Rhythm: Normal rate and regular rhythm.      Pulses: Normal pulses.      Heart sounds: Normal heart sounds.   Pulmonary:      Effort: Pulmonary effort is normal.      Breath sounds: Normal breath sounds.   Abdominal:      General: Abdomen is flat. Bowel sounds are normal.      Palpations: Abdomen is soft.      Tenderness: There is no right CVA tenderness or left CVA tenderness.   Skin:     General: Skin is warm and dry.      Comments: No tenting.   Neurological:      Mental Status: He is alert.   Psychiatric:         Mood and Affect: Mood normal.         Labs:  No results found for this or any previous visit (from the past 24 hour(s)).    X-Ray was not done.    ASSESSMENT:      ICD-10-CM    1. Diarrhea, unspecified type  R19.7 CBC with platelets and differential     Comprehensive metabolic panel (BMP + Alb, Alk Phos, ALT, AST, Total. Bili, TP)     Enteric Bacteria and Virus Panel by YENI Stool     Ova and Parasite Exam Routine           Medical Decision Making:    Differential Diagnosis:  Gastro: traveler's diahhrea and intestinal parasites    Serious Comorbid Conditions:  Adult:  reviewed    PLAN:    BRAT diet and increased fluids.  May continue imodium.  All labs pending.  Will call with any abnormalities.      Followup:    If not improving or if condition worsens, follow up with your Primary Care Provider, If not improving or if conditions worsens over the next 12-24 hours, go to the Emergency Department    There are no Patient Instructions on file for this visit.

## 2023-09-23 ENCOUNTER — HEALTH MAINTENANCE LETTER (OUTPATIENT)
Age: 48
End: 2023-09-23

## 2024-06-17 ENCOUNTER — OFFICE VISIT (OUTPATIENT)
Dept: FAMILY MEDICINE | Facility: CLINIC | Age: 49
End: 2024-06-17
Payer: COMMERCIAL

## 2024-06-17 VITALS
SYSTOLIC BLOOD PRESSURE: 106 MMHG | RESPIRATION RATE: 10 BRPM | HEIGHT: 72 IN | BODY MASS INDEX: 24.79 KG/M2 | WEIGHT: 183 LBS | TEMPERATURE: 97.1 F | OXYGEN SATURATION: 97 % | HEART RATE: 72 BPM | DIASTOLIC BLOOD PRESSURE: 70 MMHG

## 2024-06-17 DIAGNOSIS — Z01.84 IMMUNITY STATUS TESTING: ICD-10-CM

## 2024-06-17 DIAGNOSIS — Z71.84 TRAVEL ADVICE ENCOUNTER: Primary | ICD-10-CM

## 2024-06-17 PROCEDURE — 86735 MUMPS ANTIBODY: CPT | Mod: GA | Performed by: NURSE PRACTITIONER

## 2024-06-17 PROCEDURE — 86765 RUBEOLA ANTIBODY: CPT | Mod: GA | Performed by: NURSE PRACTITIONER

## 2024-06-17 PROCEDURE — 36415 COLL VENOUS BLD VENIPUNCTURE: CPT | Mod: GA | Performed by: NURSE PRACTITIONER

## 2024-06-17 PROCEDURE — 90717 YELLOW FEVER VACCINE SUBQ: CPT | Mod: GA | Performed by: NURSE PRACTITIONER

## 2024-06-17 PROCEDURE — 99401 PREV MED CNSL INDIV APPRX 15: CPT | Mod: 25 | Performed by: NURSE PRACTITIONER

## 2024-06-17 PROCEDURE — 90715 TDAP VACCINE 7 YRS/> IM: CPT | Mod: GA | Performed by: NURSE PRACTITIONER

## 2024-06-17 PROCEDURE — 86762 RUBELLA ANTIBODY: CPT | Mod: GA | Performed by: NURSE PRACTITIONER

## 2024-06-17 PROCEDURE — 90691 TYPHOID VACCINE IM: CPT | Mod: GA | Performed by: NURSE PRACTITIONER

## 2024-06-17 PROCEDURE — 90471 IMMUNIZATION ADMIN: CPT | Mod: GA | Performed by: NURSE PRACTITIONER

## 2024-06-17 PROCEDURE — 90472 IMMUNIZATION ADMIN EACH ADD: CPT | Mod: GA | Performed by: NURSE PRACTITIONER

## 2024-06-17 RX ORDER — AZITHROMYCIN 500 MG/1
500 TABLET, FILM COATED ORAL DAILY
Qty: 3 TABLET | Refills: 0 | Status: SHIPPED | OUTPATIENT
Start: 2024-06-17 | End: 2024-06-20

## 2024-06-17 RX ORDER — ONDANSETRON 4 MG/1
4 TABLET, ORALLY DISINTEGRATING ORAL EVERY 8 HOURS PRN
Qty: 5 TABLET | Refills: 0 | Status: SHIPPED | OUTPATIENT
Start: 2024-06-17

## 2024-06-17 RX ORDER — ATOVAQUONE AND PROGUANIL HYDROCHLORIDE 250; 100 MG/1; MG/1
1 TABLET, FILM COATED ORAL DAILY
Qty: 15 TABLET | Refills: 0 | Status: SHIPPED | OUTPATIENT
Start: 2024-06-17

## 2024-06-17 ASSESSMENT — PAIN SCALES - GENERAL: PAINLEVEL: NO PAIN (0)

## 2024-06-17 NOTE — PATIENT INSTRUCTIONS
Thank you for visiting the [facility] : [phone]  Today June 17, 2024 you received the    Yellow Fever (YF)    Tetanus (Tdap) Vaccine    Typhoid - injectable. This vaccine is valid for two years.     Follow up vaccine appointments can be made as a NURSE ONLY visit at the Travel Clinic, (BE PREPARED TO WAIT, ) or at designated [facility].    If you are receiving the Rabies vaccines series, it is important that you follow the exact schedule ordered.     Pre-travel     We recommend that you purchase Medical Evacuation Insurance prior to your departure.  Https://wwwnc.cdc.gov/travel/page/insurance    [place] your travel plans with the  Department of State through STEP ( Smart Traveler Enrollment Program ) https://step.state.gov.  STEP is a free service to allow U[place] citizens and nationals traveling and living abroad to enroll their trip with the nearest U.S. Embassy or Consulate.    Animal Exposure: Avoid all mammals even if they look healthy.  If there is a bite, scratch or even a lick, wash area immediately with soap and water for 15 minutes and seek medical care within 24 hours for evaluation of Rabies post exposure treatment.  Contact your Medical Evacuation Insurance.    Repiratory illness prevention strategies ( Covid and Influenza ) CDC recommendations:  Consider wearing a mask in crowded or poorly ventilated indoor areas, including on public transportation and in transportation hubs.  Hand washing: frequent, thorough handwashing with soap and water for 20 seconds. Use an alcohol-based hand  with at least 60% alcohol if soap and water are not readily available. Avoid touching face, nose, eyes, mouth unless you have done appropriate hand washing as above.  VACCINES: Staying up to date on COVID-19 vaccines significantly lowers the risk of getting very sick, being hospitalized, or dying from COVID-19. CDC recommends that everyone stay up to date on  their COVID-19 vaccines, especially people with weakened immune systems.    Travel Covid 19 Testing:  updated 12/06/2021  International travelers: Pre-travel:  See country specific Embassy websites or airline websites.    Post travel: CDC recommends getting tested 3-5 days after your trip     Post-travel illness:  Contact your provider or [facility] if you develop a fever, rash, cough, diarrhea or other symptoms for up to 1 year after travel.  Inform your healthcare provider when and where you traveled to.    Please call the [facility] with any questions [phone]  Or send your provider a 'My Chart' note.

## 2024-06-17 NOTE — PROGRESS NOTES
Nurse Note ( Pre-Travel Consult)    Itinerary:  [place]    Departure Date: 07/19    Return Date: 08/01    Length of Trip 2 weeks     Reason for Travel: Tourism         Urban or rural: both    Accommodations: Hotel        IMMUNIZATION HISTORY  Have you received any immunizations within the past 4 weeks?  No  Have you ever fainted from having your blood drawn or from an injection?  No  Have you ever had a fever reaction to vaccination?  No  Have you ever had any bad reaction or side effect from any vaccination?  No  Have you ever had hepatitis A or B vaccine?  Yes  Do you live (or work closely) with anyone who has AIDS, an AIDS-like condition, any other immune disorder or who is on chemotherapy for cancer?  No  Do you have a family history of immunodeficiency?  No  Have you received any injection of immune globulin or any blood products during the past 12 months?  No    Patient roomed by [facility]    Subjective  Abhishek Delatorre is a 48 year old maleseen today with family member for counsultation for international travel.   Patient will be departing in  1 month(s) and  traveling with family member(s).      Patient itinerary :  will be in the [place] > [place] > [place] [address] ( birthday )  for 2 nights then [place] 1 night > [place] > [place] 3 night > [place] 4 days 3 nights  > which risk for Malaria, Yellow Fever, Rabies, food borne illnesses, and Typhoid. exposure.      Patient's activities will include sightseeing, jungle, high altitude exposure, and travel by car or other vehicle.    Patient's country of birth is [place]    Special medical concerns: motion sickness  Pre-travel questionnaire was completed by patient and reviewed by provider.     Vitals: /70   Pulse 72   Temp 97.1  F (36.2  C) (Temporal)   Resp 10   Ht 1.829 m (6')   Wt 83 kg (183 lb)   SpO2 97%   BMI 24.82 kg/m    BMI= Body mass index is 24.82 kg/m .    EXAM:  General:  Well-nourished, well-developed in no acute distress.  Appears to be stated age,  interacts appropriately and expresses understanding of information given to patient.    Current Outpatient Medications   Medication Sig Dispense Refill    atovaquone-proguanil (MALARONE) 250-100 MG tablet Take 1 tablet by mouth daily Start 2 days before exposure to Malaria and continue daily till  7 days after exposure. 15 tablet 0    azithromycin (ZITHROMAX) 500 MG tablet Take 1 tablet (500 mg) by mouth daily for 3 doses Take 1 tablet a day for up to 3 days for severe diarrhea 3 tablet 0    Eyelid Cleansers (OCUSOFT EYELID CLEANSING EX) Externally apply 1 Application topically 2 times daily      metroNIDAZOLE (METROCREAM) 0.75 % external cream Apply topically daily      ondansetron (ZOFRAN ODT) 4 MG ODT tab Take 1 tablet (4 mg) by mouth every 8 hours as needed for nausea or vomiting 5 tablet 0     Patient Active Problem List   Diagnosis    Unilateral inguinal hernia without obstruction or gangrene, recurrence not specified    Blepharitis of left eye     Allergies   Allergen Reactions    Sulfa Antibiotics      Hives and fatigue         Immunizations discussed include:   Covid 19: Declined  Not concerned about risk of disease  Hepatitis A:  Up to date  Hepatitis B: Up to date  Influenza: vaccine is not available  Typhoid: Ordered/given today, risks, benefits and side effects reviewed  Rabies: Declined  reviewed managment of a animal bite or scratch (washing wound, seek medical care within 24 hours for post exposure prophylaxis )  Yellow Fever: Yellow Fever ordered/given today - side effects, precautions, allergies, risks discussed. Patient expressed understanding.  [place] Encephalitis: Not indicated  Meningococcus: Not indicated  Tetanus/Diphtheria: Ordered/given today, risks, benefits and side effects reviewed  Measles/Mumps/Rubella: Titers drawn  Cholera: Not needed  Polio: Not indicated  Pneumococcal: Under age of 65  Varicella: Immune by disease history per patient report  Shingrix: Not indicated  HPV:  Not  indicated     TB: low risk     Altitude Exposure on this trip: yes up to 11,200 but will have a [place] nights [place]   Past tolerance to Altitude: tolerates 10,000, declines diamox    ASSESSMENT/PLAN:  Abhishek was seen today for travel clinic.    Diagnoses and all orders for this visit:    Travel advice encounter  -     atovaquone-proguanil (MALARONE) 250-100 MG tablet; Take 1 tablet by mouth daily Start 2 days before exposure to Malaria and continue daily till  7 days after exposure.  -     azithromycin (ZITHROMAX) 500 MG tablet; Take 1 tablet (500 mg) by mouth daily for 3 doses Take 1 tablet a day for up to 3 days for severe diarrhea  -     ondansetron (ZOFRAN ODT) 4 MG ODT tab; Take 1 tablet (4 mg) by mouth every 8 hours as needed for nausea or vomiting    Immunity status testing  -     Rubeola Antibody IgG; Future  -     Rubella Antibody IgG; Future  -     Mumps Immune Status, IgG; Future  -     Rubeola Antibody IgG  -     Rubella Antibody IgG  -     Mumps Immune Status, IgG    Other orders  -     TYPHOID VACCINE, IM  -     YELLOW FEVER, LIVE SQ  -     TDAP 7+ (ADACEL,BOOSTRIX)      I have reviewed general recommendations for safe travel   including: food/water precautions, insect precautions, safer sex   practices given high prevalence of Zika, HIV and other STDs,   roadway safety. Educational materials and [facility] report provided.    Malaraia prophylaxis recommended: Malarone  Symptomatic treatment for traveler's diarrhea: azithromycin  Altitude illness prevention and treatment: declined Diamox       Evacuation insurance advised and resources were provided to patient.    Total visit time 20 minutes  with over 50% of time spent counseling patient and shared decision making as detailed above.    Gracie Chauhan CNP  Certificate in Travel Health

## 2024-06-18 LAB
MEV IGG SER IA-ACNC: 107 AU/ML
MEV IGG SER IA-ACNC: POSITIVE
MUMPS ANTIBODY IGG INSTRUMENT VALUE: 25.9 AU/ML
MUV IGG SER QL IA: POSITIVE
RUBV IGG SERPL QL IA: 1.08 INDEX
RUBV IGG SERPL QL IA: POSITIVE

## 2024-06-19 NOTE — RESULT ENCOUNTER NOTE
Brayden Weiss,    Your blood test shows that you are immune or protected against Measles < Mumps and Rubella.  I'll add this to your vaccine records.  You do not need the MMR vaccine    Safe travels  Gracie Chauhan (Lori) CNP  [facility]  Certificate in Travel Health

## 2024-08-05 NOTE — PATIENT INSTRUCTIONS
Preparing for Your Surgery      Name:  Abhishek Delatorre   MRN:  [id number]   :  1975   Today's Date:  2020     Arriving for surgery:  Surgery date:  20  Arrival time:  12:20 pm    Please come to:       [facility] [address]  [address] [place]  [address]    - ? parking is available in front of the hospital      -    Please proceed to [address] on the 3rd floor. [phone]?     - ?If you are in need of directions, wheelchair or escort please stop at the Information Desk in the lobby.  Inform the information person that you are here for surgery; a wheelchair and escort to [address] will be provided.?     -  Bring your ID and insurance card.    - If you are scheduled to go home the Same Day as surgery you must have a responsible adult as a  and to stay with you overnight the first 24 hours after surgery.     What can I eat or drink?  -  You may have solid food or milk products until 8 hours prior to your surgery. (Until 6:20 am )  -  You may have water, apple juice or 7up/Sprite until 2 hours prior to your surgery. (Until 12:20 pm )    Which medicines can I take?       -  Do not bring your own medications to the hospital.        -  Follow Surgery Clinic instructions regarding Ibuprofen. If no instructions given, NO Ibuprofen the day prior to surgery.           -  Hold Naproxen (Aleve) 2 days prior to surgery.        -  Hold Aspirin and Aspirin products for 7 days prior to surgery.        -  Hold Multivitamin starting now, if you haven't already.    -  Please take these medications the morning of surgery:  Acetaminophen (Tylenol) if needed    How do I prepare myself?  -  Take two showers: one the night before surgery; and one the morning of surgery.         Use Scrubcare or Hibiclens to wash from neck down.  You may use your own shampoo and conditioner. No other hair products.   -  Do NOT use lotion, powder, colognes, deodorant, or antiperspirant the day  Rounding with change of shift report.  Patient is resting in bed with blanket over her head, with eyes closed.  Safety precautions in place of bed in low position, locked.  Upper side rails x 2 raised.  Call light within reach.     of your surgery.  -  Do NOT wear any jewelry.    Questions or Concerns:  If you have questions or concerns prior to your surgery, call [phone]. (Mon - Fri   8 am- 5:30 pm)  Questions about surgery, contact your Surgeons office.  If you have any health changes prior to surgery, please notify your Surgeon.    AFTER YOUR SURGERY  Breathing exercises   Breathing exercises help you recover faster. Take deep breaths and let the air out slowly. This will:     Help you wake up after surgery.    Help prevent complications like pneumonia.  Preventing complications will help you go home sooner.   We may give you a breathing device (incentive spirometer) to encourage you to breathe deeply.   Nausea and vomiting   You may feel sick to your stomach after surgery; if so, let your nurse know.    Pain control:  After surgery, you may have pain. Our goal is to help you manage your pain. Pain medicine will help you feel comfortable enough to do activities that will help you heal.  These activities may include breathing exercises, walking and physical therapy.   To help your health care team treat your pain we will ask: 1) If you have pain  2) where it is located 3) describe your pain in your words  Methods of pain control include medications given by mouth, vein or by nerve block for some surgeries.  Sequential Compression Device (SCD) or Pneumo Boots:  You may need to wear SCD S on your legs or feet. These are wraps connected to a machine that pumps in air and releases it. The repeated pumping helps prevent blood clots from forming.

## 2024-08-20 SDOH — HEALTH STABILITY: PHYSICAL HEALTH: ON AVERAGE, HOW MANY DAYS PER WEEK DO YOU ENGAGE IN MODERATE TO STRENUOUS EXERCISE (LIKE A BRISK WALK)?: 4 DAYS

## 2024-08-20 SDOH — HEALTH STABILITY: PHYSICAL HEALTH: ON AVERAGE, HOW MANY MINUTES DO YOU ENGAGE IN EXERCISE AT THIS LEVEL?: 40 MIN

## 2024-08-20 ASSESSMENT — SOCIAL DETERMINANTS OF HEALTH (SDOH): HOW OFTEN DO YOU GET TOGETHER WITH FRIENDS OR RELATIVES?: TWICE A WEEK

## 2024-08-21 ENCOUNTER — OFFICE VISIT (OUTPATIENT)
Dept: FAMILY MEDICINE | Facility: CLINIC | Age: 49
End: 2024-08-21
Payer: COMMERCIAL

## 2024-08-21 VITALS
OXYGEN SATURATION: 99 % | HEART RATE: 91 BPM | HEIGHT: 71 IN | TEMPERATURE: 98.9 F | RESPIRATION RATE: 18 BRPM | SYSTOLIC BLOOD PRESSURE: 114 MMHG | WEIGHT: 184.8 LBS | DIASTOLIC BLOOD PRESSURE: 76 MMHG | BODY MASS INDEX: 25.87 KG/M2

## 2024-08-21 DIAGNOSIS — Z13.1 SCREENING FOR DIABETES MELLITUS: ICD-10-CM

## 2024-08-21 DIAGNOSIS — H91.90 DECREASED HEARING, UNSPECIFIED LATERALITY: ICD-10-CM

## 2024-08-21 DIAGNOSIS — Z11.4 SCREENING FOR HIV (HUMAN IMMUNODEFICIENCY VIRUS): ICD-10-CM

## 2024-08-21 DIAGNOSIS — Z11.59 NEED FOR HEPATITIS C SCREENING TEST: ICD-10-CM

## 2024-08-21 DIAGNOSIS — Z00.00 ROUTINE HISTORY AND PHYSICAL EXAMINATION OF ADULT: Primary | ICD-10-CM

## 2024-08-21 DIAGNOSIS — Z12.5 SCREENING FOR PROSTATE CANCER: ICD-10-CM

## 2024-08-21 DIAGNOSIS — N43.3 LEFT HYDROCELE: ICD-10-CM

## 2024-08-21 DIAGNOSIS — R74.01 ELEVATED AST (SGOT): ICD-10-CM

## 2024-08-21 DIAGNOSIS — Z13.220 LIPID SCREENING: ICD-10-CM

## 2024-08-21 LAB
ALBUMIN SERPL BCG-MCNC: 4.5 G/DL (ref 3.5–5.2)
ALBUMIN UR-MCNC: NEGATIVE MG/DL
ALP SERPL-CCNC: 52 U/L (ref 40–150)
ALT SERPL W P-5'-P-CCNC: 52 U/L (ref 0–70)
ANION GAP SERPL CALCULATED.3IONS-SCNC: 10 MMOL/L (ref 7–15)
APPEARANCE UR: CLEAR
AST SERPL W P-5'-P-CCNC: 52 U/L (ref 0–45)
BACTERIA #/AREA URNS HPF: ABNORMAL /HPF
BILIRUB SERPL-MCNC: 1.1 MG/DL
BILIRUB UR QL STRIP: NEGATIVE
BUN SERPL-MCNC: 14.4 MG/DL (ref 6–20)
CALCIUM SERPL-MCNC: 9.2 MG/DL (ref 8.8–10.4)
CHLORIDE SERPL-SCNC: 104 MMOL/L (ref 98–107)
CHOLEST SERPL-MCNC: 171 MG/DL
COLOR UR AUTO: YELLOW
CREAT SERPL-MCNC: 0.84 MG/DL (ref 0.67–1.17)
EGFRCR SERPLBLD CKD-EPI 2021: >90 ML/MIN/1.73M2
FASTING STATUS PATIENT QL REPORTED: NO
FASTING STATUS PATIENT QL REPORTED: NO
GLUCOSE SERPL-MCNC: 94 MG/DL (ref 70–99)
GLUCOSE UR STRIP-MCNC: NEGATIVE MG/DL
HBA1C MFR BLD: 4.7 % (ref 0–5.6)
HCO3 SERPL-SCNC: 26 MMOL/L (ref 22–29)
HCV AB SERPL QL IA: NONREACTIVE
HDLC SERPL-MCNC: 60 MG/DL
HGB UR QL STRIP: NEGATIVE
HIV 1+2 AB+HIV1 P24 AG SERPL QL IA: NONREACTIVE
KETONES UR STRIP-MCNC: ABNORMAL MG/DL
LDLC SERPL CALC-MCNC: 100 MG/DL
LEUKOCYTE ESTERASE UR QL STRIP: NEGATIVE
NITRATE UR QL: NEGATIVE
NONHDLC SERPL-MCNC: 111 MG/DL
PH UR STRIP: 6.5 [PH] (ref 5–7)
POTASSIUM SERPL-SCNC: 4.4 MMOL/L (ref 3.4–5.3)
PROT SERPL-MCNC: 7.3 G/DL (ref 6.4–8.3)
PSA SERPL DL<=0.01 NG/ML-MCNC: 0.42 NG/ML (ref 0–2.5)
RBC #/AREA URNS AUTO: ABNORMAL /HPF
SODIUM SERPL-SCNC: 140 MMOL/L (ref 135–145)
SP GR UR STRIP: 1.02 (ref 1–1.03)
SQUAMOUS #/AREA URNS AUTO: ABNORMAL /LPF
TRIGL SERPL-MCNC: 56 MG/DL
UROBILINOGEN UR STRIP-ACNC: 0.2 E.U./DL
WBC #/AREA URNS AUTO: ABNORMAL /HPF

## 2024-08-21 PROCEDURE — 80061 LIPID PANEL: CPT | Performed by: INTERNAL MEDICINE

## 2024-08-21 PROCEDURE — 80053 COMPREHEN METABOLIC PANEL: CPT | Performed by: INTERNAL MEDICINE

## 2024-08-21 PROCEDURE — G0103 PSA SCREENING: HCPCS | Performed by: INTERNAL MEDICINE

## 2024-08-21 PROCEDURE — 99396 PREV VISIT EST AGE 40-64: CPT | Performed by: INTERNAL MEDICINE

## 2024-08-21 PROCEDURE — 87389 HIV-1 AG W/HIV-1&-2 AB AG IA: CPT | Performed by: INTERNAL MEDICINE

## 2024-08-21 PROCEDURE — 81001 URINALYSIS AUTO W/SCOPE: CPT | Performed by: INTERNAL MEDICINE

## 2024-08-21 PROCEDURE — 36415 COLL VENOUS BLD VENIPUNCTURE: CPT | Performed by: INTERNAL MEDICINE

## 2024-08-21 PROCEDURE — 83036 HEMOGLOBIN GLYCOSYLATED A1C: CPT | Performed by: INTERNAL MEDICINE

## 2024-08-21 PROCEDURE — 99213 OFFICE O/P EST LOW 20 MIN: CPT | Mod: 25 | Performed by: INTERNAL MEDICINE

## 2024-08-21 PROCEDURE — 86803 HEPATITIS C AB TEST: CPT | Performed by: INTERNAL MEDICINE

## 2024-08-21 ASSESSMENT — PAIN SCALES - GENERAL: PAINLEVEL: NO PAIN (0)

## 2024-08-21 NOTE — PROGRESS NOTES
Preventive Care Visit  [facility]  Rafal Penaloza MD, Internal Medicine  Aug 21, 2024      Assessment & Plan   Problem List Items Addressed This Visit    None  Visit Diagnoses       Routine history and physical examination of adult    -  Primary    Relevant Orders    Comprehensive metabolic panel (BMP + Alb, Alk Phos, ALT, AST, Total. Bili, TP)    Screening for HIV (human immunodeficiency virus)        Relevant Orders    HIV Antigen Antibody Combo    Need for hepatitis C screening test        Relevant Orders    Hepatitis C Screen Reflex to HCV RNA Quant and Genotype    Screening for prostate cancer        Relevant Orders    PSA, screen    Lipid screening        Relevant Orders    Lipid panel reflex to direct LDL Fasting    Left hydrocele        Relevant Orders    Adult Urology  Referral    UA with Microscopic reflex to Culture - lab collect (Completed)    UA Microscopic with Reflex to Culture (Completed)    Screening for diabetes mellitus        Relevant Orders    Hemoglobin A1c (Completed)    Decreased hearing, unspecified laterality        Relevant Orders    Adult Audiology  Referral        Patient presenting for follow-up, yearly physical.  Continues to have left hydrocele can be irritative at times during sports activities running biking etc.  He did have plantar fasciitis that resolved with some stretching exercises.  There is concern with some hearing deficit that his wife tells him he would like hearing evaluation.  Referral placed  He agrees to see urology for follow-up on the left hydrocele.  Positive family history of testicular cancer in his cousins.  Denies any urinary symptoms otherwise.  Has been conscientious of his diet he made changes and cut down on alcohol and eats more vegetables and fruits.  His wife is recovering from breast cancer surgery and chemotherapy so he had been through a stressful event for the last 6 months.  Denies any current depression or  "anxiety.  He had done some counseling as well with his children he has a 15 16-year-old kids are healthy.  Vaccines are up-to-date.  He has done some traveling and his vaccines have been updated for travel.  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    Addendum liver enzyme AST slightly elevated recommend repeat labs in 3 months.  Patient has been advised of split billing requirements and indicates understanding: Yes       BMI  Estimated body mass index is 25.45 kg/m  as calculated from the following:    Height as of this encounter: 1.815 m (5' 11.46\").    Weight as of this encounter: 83.8 kg (184 lb 12.8 oz).   Weight management plan: Discussed healthy diet and exercise guidelines    Counseling  Appropriate preventive services were addressed with this patient via screening, questionnaire, or discussion as appropriate for fall prevention, nutrition, physical activity, Tobacco-use cessation, social engagement, weight loss and cognition.  Checklist reviewing preventive services available has been given to the patient.  Reviewed patient's diet, addressing concerns and/or questions.   He is at risk for psychosocial distress and has been provided with information to reduce risk.     Work on weight loss  Regular exercise  See Patient Instructions      Darshan   Abhishek is a 48 year old, presenting for the following:  Physical (No cough or concerns \" mask pref\") and Referral (HEARING concern, onset 1 year \" can you repeat that\")        8/21/2024     1:46 PM   Additional Questions   Roomed by [facility] Care Directive  Patient does not have a Health Care Directive or Living Will: Discussed advance care planning with patient; information given to patient to review.    HPI  Patient presenting for his yearly physical doing well continues to have left testicular enlargement that can be irritative at times during sports such as running.  He also dealt with some plantar fasciitis over the last " couple months and he had did PT exercises that helped now going back to exercise and running.  He was under stress over the last couple months due to his wife's diagnosis with breast cancer.  He and his children underwent some counseling.  Also reports a niece with recent diagnosis of brain cancer that is also stressful to the family.  Patient does some teardrops over-the-counter prescribed byOphthalmology called eyelid cleansers, due to history of blepharitis.  No active disease currently.  Patient is a professor at the [employer] also has an .      8/20/2024   General Health   How would you rate your overall physical health? Good   Feel stress (tense, anxious, or unable to sleep) Only a little      (!) STRESS CONCERN      8/20/2024   Nutrition   Three or more servings of calcium each day? Yes   Diet: Regular (no restrictions)   How many servings of fruit and vegetables per day? 4 or more   How many sweetened beverages each day? 0-1            8/20/2024   Exercise   Days per week of moderate/strenous exercise 4 days   Average minutes spent exercising at this level 40 min            8/20/2024   Social Factors   Frequency of gathering with friends or relatives Twice a week   Worry food won't last until get money to buy more No   Food not last or not have enough money for food? No   Do you have housing? (Housing is defined as stable permanent housing and does not include staying ouside in a car, in a tent, in an abandoned building, in an overnight shelter, or couch-surfing.) Yes   Are you worried about losing your housing? No   Lack of transportation? No   Unable to get utilities (heat,electricity)? No            8/20/2024   Dental   Dentist two times every year? Yes            8/20/2024   TB Screening   Were you born outside of the [place]? No              Today's PHQ-2 Score:       6/17/2024     9:34 AM   PHQ-2 ( 1999 [employer])   Q1: Little interest or pleasure in doing things 0   Q2: Feeling down, depressed  or hopeless 0   PHQ-2 Score 0         8/20/2024   Substance Use   Alcohol more than 3/day or more than 7/wk No   Do you use any other substances recreationally? No        Social History     Tobacco Use    Smoking status: Never    Smokeless tobacco: Never   Vaping Use    Vaping status: Never Used   Substance Use Topics    Alcohol use: Yes     Comment: socially - Weekends    Drug use: No             8/20/2024   One time HIV Screening   Previous HIV test? No          8/20/2024   STI Screening   New sexual partner(s) since last STI/HIV test? No      ASCVD Risk   The 10-year ASCVD risk score (Robyn CARRERA, et al., 2019) is: 1.2%    Values used to calculate the score:      Age: 48 years      Sex: Male      Is Non- : No      Diabetic: No      Tobacco smoker: No      Systolic Blood Pressure: 114 mmHg      Is BP treated: No      HDL Cholesterol: 63 mg/dL      Total Cholesterol: 144 mg/dL       Reviewed and updated as needed this visit by Provider      Problems               No past medical history on file.  Past Surgical History:   Procedure Laterality Date    AS REPAIR SLIDING INGUINAL HERNIA Left 2008    DAVINCI HERNIORRHAPHY INGUINAL Right 1/9/2020    Procedure: HERNIORRHAPHY, INGUINAL, ROBOT-ASSISTED, WITH MESH, RIGHT;  Surgeon: Shaggy Sevilla MD;  Location: [facility] OR    HERNIA REPAIR  12/2007, 01/2020    left-side, right-side     Lab work is in process  Labs reviewed in EPIC  BP Readings from Last 3 Encounters:   08/21/24 114/76   06/17/24 106/70   04/11/23 130/80    Wt Readings from Last 3 Encounters:   08/21/24 83.8 kg (184 lb 12.8 oz)   06/17/24 83 kg (183 lb)   04/11/23 81.6 kg (180 lb)                  Patient Active Problem List   Diagnosis    Unilateral inguinal hernia without obstruction or gangrene, recurrence not specified    Blepharitis of left eye     Past Surgical History:   Procedure Laterality Date    AS REPAIR SLIDING INGUINAL HERNIA Left 2008    DAVINCI HERNIORRHAPHY  INGUINAL Right 1/9/2020    Procedure: HERNIORRHAPHY, INGUINAL, ROBOT-ASSISTED, WITH MESH, RIGHT;  Surgeon: Shaggy Sevilla MD;  Location: [facility] OR    HERNIA REPAIR  12/2007, 01/2020    left-side, right-side       Social History     Tobacco Use    Smoking status: Never    Smokeless tobacco: Never   Substance Use Topics    Alcohol use: Yes     Comment: socially - Weekends     Family History   Problem Relation Age of Onset    Alcohol/Drug Mother     Heart Murmur Mother     Substance Abuse Mother     Hypertension Father     Alcohol/Drug Paternal Grandmother     Anesthesia Reaction No family hx of     Cardiovascular No family hx of     Deep Vein Thrombosis No family hx of          Current Outpatient Medications   Medication Sig Dispense Refill    Eyelid Cleansers (OCUSOFT EYELID CLEANSING EX) Externally apply 1 Application topically 2 times daily      metroNIDAZOLE (METROCREAM) 0.75 % external cream Apply topically daily      atovaquone-proguanil (MALARONE) 250-100 MG tablet Take 1 tablet by mouth daily Start 2 days before exposure to Malaria and continue daily till  7 days after exposure. 15 tablet 0    ondansetron (ZOFRAN ODT) 4 MG ODT tab Take 1 tablet (4 mg) by mouth every 8 hours as needed for nausea or vomiting 5 tablet 0     Allergies   Allergen Reactions    Sulfa Antibiotics      Hives and fatigue     Recent Labs   Lab Test 08/21/24  1431 04/11/23  1134 06/28/21  1550 01/06/20  1038   A1C 4.7  --  4.8  --    LDL  --   --  67  --    HDL  --   --  63  --    TRIG  --   --  69  --    ALT  --  17 24  --    CR  --  0.83 0.91 0.79   GFRESTIMATED  --  >90 >90 >90   GFRESTBLACK  --   --  >90 >90   POTASSIUM  --  4.0 3.9 4.2          Review of Systems  Constitutional, neuro, ENT, endocrine, pulmonary, cardiac, gastrointestinal, genitourinary, musculoskeletal, integument and psychiatric systems are negative, except as otherwise noted.     Objective    Exam  /76 (BP Location: Left arm, Patient Position: Sitting,  "Cuff Size: Adult Regular)   Pulse 91   Temp 98.9  F (37.2  C) (Temporal)   Resp 18   Ht 1.815 m (5' 11.46\")   Wt 83.8 kg (184 lb 12.8 oz)   SpO2 99%   BMI 25.45 kg/m     Estimated body mass index is 25.45 kg/m  as calculated from the following:    Height as of this encounter: 1.815 m (5' 11.46\").    Weight as of this encounter: 83.8 kg (184 lb 12.8 oz).    Physical Exam  GENERAL: alert and no distress  EYES: Eyes grossly normal to inspection, PERRL and conjunctivae and sclerae normal  HENT: ear canals and TM's normal, nose and mouth without ulcers or lesions  NECK: no adenopathy, no asymmetry, masses, or scars  RESP: lungs clear to auscultation - no rales, rhonchi or wheezes  CV: regular rate and rhythm, normal S1 S2, no S3 or S4, no murmur, click or rub, no peripheral edema  ABDOMEN: soft, nontender, no hepatosplenomegaly, no masses and bowel sounds normal   (male): testicles normal without atrophy or masses, no hernias, and penis normal without urethral discharge.  Enlarged left testicle soft no hard masses palpable.  MS: no gross musculoskeletal defects noted, no edema  SKIN: no suspicious lesions or rashes  NEURO: Normal strength and tone, mentation intact and speech normal  PSYCH: mentation appears normal, affect normal/bright        Signed Electronically by: Rafal Penaloza MD    "

## 2024-09-06 NOTE — TELEPHONE ENCOUNTER
MEDICAL RECORDS REQUEST   [facility]  [facility]  [address] [place] [address]  PHONE: [phone]  Fax: [phone]        FUTURE VISIT INFORMATION                                                   Abhishek Delatorre, : 1975 scheduled for future visit at [facility]    APPOINTMENT INFORMATION:  Date: 2024  Provider:  Ryland Seth PA-C  Reason for Visit/Diagnosis: Left hydrocele    REFERRAL INFORMATION:  Referring provider:  Rafal Penaloza MD in  [facility]      RECORDS REQUESTED FOR VISIT                                                     NOTES  STATUS/DETAILS   OFFICE NOTE from referring provider  yes, 2024 -- Rafal Penaloza MD in  [facility]   MEDICATION LIST  yes   LABS     URINALYSIS (UA)  yes   IMAGES  yes, 2021 -- US TESTICULAR AND SCROTUM     PRE-VISIT CHECKLIST      Joint diagnostic appointment coordinated correctly          (ensure right order & amount of time) Yes   RECORD COLLECTION COMPLETE Yes

## 2024-09-09 ENCOUNTER — PRE VISIT (OUTPATIENT)
Dept: UROLOGY | Facility: CLINIC | Age: 49
End: 2024-09-09
Payer: COMMERCIAL

## 2024-09-09 NOTE — TELEPHONE ENCOUNTER
Reason for visit: Left hydrocele     Relevant information: positive family hx for testicular cancer in cousins    Records/imaging/labs/orders: all records available    Pt called: no need for a call    At Rooming: have pt empty bladder/pvr,  Standard rooming      Daljit Neumann  9/9/2024  5:00 PM

## 2024-09-10 NOTE — PROGRESS NOTES
Subjective     REFERRING PROVIDER  Rafal Penaloza MD    REASON FOR VISIT  Persistent left hydrocele     HISTORY OF PRESENT ILLNESS  Mr. Delatorre is a pleasant 48 year old male with a past medical history significant for unilateral inguinal hernia who presents today for persistent and bothersome left hydrocele.  I personally reviewed the family practice visit note from 8/21/2024 in preparation for today's visit.    According to that note, this has been a finding he is known about for many years with his last testicular ultrasound dating back to June 2021.  At this family practice visit he endorsed continued irritation of this hydrocele during activities such as running and biking.  Ultimately was recommended to follow-up with urology for discussion of intervention.    Today:  In the last few months, began to have some tenderness in the left testicle  Mindful of support when running and being active   No real change int he lat three years   No gross hematuria  No baseline bothersome lower urinary tract symptoms   No history of orchitis     REVIEW OF SYSTEMS  Review of Systems   Constitutional:  Negative for fatigue and unexpected weight change.   HENT:  Negative for ear pain.    Eyes:  Negative for visual disturbance.   Respiratory:  Negative for shortness of breath.    Cardiovascular:  Negative for chest pain.   Gastrointestinal:  Negative for abdominal pain and constipation.   Genitourinary:  Negative for hematuria.   Musculoskeletal:  Negative for back pain (Occasionally wakes up with low back pain).   Neurological:  Negative for dizziness and light-headedness.   Hematological:  Negative for adenopathy.   Psychiatric/Behavioral:  Negative for sleep disturbance.      Social History:  Denies any history of or current smoking     Family History:  Two cousins with history of testicular cancer     Surgical history:  History of bilateral hernia repairs     Objective     PHYSICAL EXAM  Physical Exam  Constitutional:        Appearance: Normal appearance.   HENT:      Head: Normocephalic and atraumatic.      Nose: No congestion.      Mouth/Throat:      Mouth: Mucous membranes are moist.   Eyes:      General:         Right eye: No discharge.         Left eye: No discharge.   Pulmonary:      Effort: No respiratory distress.   Abdominal:      General: There is no distension.   Musculoskeletal:         General: No deformity.   Skin:     General: Skin is warm and dry.   Neurological:      Mental Status: He is alert and oriented to person, place, and time.   Psychiatric:         Mood and Affect: Mood normal.         Behavior: Behavior normal.       LABORATORY   Latest Reference Range & Units 08/21/24 14:37   Color Urine Colorless, Straw, Light Yellow, Yellow  Yellow   Appearance Urine Clear  Clear   Glucose Urine Negative mg/dL Negative   Bilirubin Urine Negative  Negative   Ketones Urine Negative mg/dL Trace !   Specific Gravity Urine 1.003 - 1.035  1.020   pH Urine 5.0 - 7.0  6.5   Protein Albumin Urine Negative mg/dL Negative   Urobilinogen Urine 0.2, 1.0 E.U./dL 0.2   Nitrite Urine Negative  Negative   Blood Urine Negative  Negative   Leukocyte Esterase Urine Negative  Negative   WBC Urine 0-5 /HPF /HPF 0-5   RBC Urine 0-2 /HPF /HPF 0-2   Bacteria Urine None Seen /HPF None Seen   Squamous Epithelial /LPF Urine None Seen /LPF Few !   !: Data is abnormal    Lab Results   Component Value Date/Time    PSA 0.42 08/21/2024 02:31 PM     IMAGING  Previous testicular ultrasound from 6/30/2021    Narrative & Impression   EXAMINATION: US TESTICULAR AND SCROTUM WITH DOPPLER LIMITED, 6/30/2021  10:45 AM      COMPARISON: 10/29/2010     HISTORY: Enlarged testicle     TECHNIQUE: The was scanned in standard fashion with specialized  ultrasound transducer(s) using grey scale, color Doppler, and spectral  flow  techniques.     Findings:  The right testis demonstrates normal echotexture and vascularity. Mild  parenchymal striations of the left testicle.  Tiny echogenic foci in  the left mediastinum testis, likely inspissated material within the  seminiferous tubules. Unchanged tiny benign echogenic focus in the  peripheral upper right testicle, of no clinical significance. No focal  suspicious testicular lesion. The right testicle measures 3.4 x 4.6 x  2.7 cm and the left measures 3.2 x 4.9 x 3.3 cm.      Moderate left hydrocele. No varicocele.     Both the right and left epididymis are within normal limits.                                                                         Impression:   1. Moderate left hydrocele.  2. Mildly striated left testicular parenchyma suggests prior  infectious, inflammatory, or ischemic insult. Currently bilateral  testicular vascularity is normal without evidence of testicular  inflammation.  3. No suspicious testicular lesion.     Assessment & Plan   Persistent left hydrocele    It was my pleasure to meet with Mr. Delatorre in the office today in regards to his chronic hydrocele. We reviewed the natural history of hydroceles, potential contributing causes, and management options. In the far majority of cases, hydroceles are benign conditions and are not associated with any testicular or scrotal pain. We noted that the progression in size of hydroceles is unpredictable, and many will remain the same size for years, while others may continually expand with time. We discussed that the treatment of the hydrocele is based on the extent of bother caused by the size and bulk of the hydrocele itself. If conservative measures are insufficient, an outpatient surgical procedure is most often used for definitive therapy. Potential risks with surgery include a small risk for decreased fertility, potential injury to the testicle itself or surrounding structures, as well as the possibility for infection, recurrence of the fluid collection, or development of a temporary blood collection in the scrotum which may resolve spontaneously or require a  repeat procedure to adequately drain.  Finally, we reviewed the risks of chronic scrotal content pain as well as the very very low risk of losing the testicle.    After reviewing the above information as well as the differences between aspiration and sclerosis and hydrocelectomy, Abhishek expressed the desire to move forward with a hydrocelectomy first available.  I will have him get an up-to-date testicular ultrasound first to confirm anatomy and any changes since 2021, and I will send a message to Dr. Cameron for discussion of scheduling either an office visit for physical exam or surgical listing.    Mr. Delatorre expressed understanding and agreement to the above discussion and plan and all of his questions were answered to his satisfaction.     PLAN  Desire for hydrocelectomy  First available testicular ultrasound    Signed by:    Ryland Seth PA-C

## 2024-09-11 ENCOUNTER — OFFICE VISIT (OUTPATIENT)
Dept: UROLOGY | Facility: CLINIC | Age: 49
End: 2024-09-11
Payer: COMMERCIAL

## 2024-09-11 ENCOUNTER — PRE VISIT (OUTPATIENT)
Dept: UROLOGY | Facility: CLINIC | Age: 49
End: 2024-09-11

## 2024-09-11 VITALS
HEIGHT: 72 IN | HEART RATE: 79 BPM | DIASTOLIC BLOOD PRESSURE: 75 MMHG | BODY MASS INDEX: 24.38 KG/M2 | WEIGHT: 180 LBS | OXYGEN SATURATION: 99 % | SYSTOLIC BLOOD PRESSURE: 151 MMHG

## 2024-09-11 DIAGNOSIS — N43.3 LEFT HYDROCELE: Primary | ICD-10-CM

## 2024-09-11 PROCEDURE — 99244 OFF/OP CNSLTJ NEW/EST MOD 40: CPT | Performed by: STUDENT IN AN ORGANIZED HEALTH CARE EDUCATION/TRAINING PROGRAM

## 2024-09-11 ASSESSMENT — ENCOUNTER SYMPTOMS
LIGHT-HEADEDNESS: 0
FATIGUE: 0
HEMATURIA: 0
ABDOMINAL PAIN: 0
CONSTIPATION: 0
ADENOPATHY: 0
SLEEP DISTURBANCE: 0
BACK PAIN: 0
DIZZINESS: 0
SHORTNESS OF BREATH: 0
UNEXPECTED WEIGHT CHANGE: 0

## 2024-09-11 ASSESSMENT — PAIN SCALES - GENERAL: PAINLEVEL: NO PAIN (0)

## 2024-09-11 NOTE — LETTER
9/11/2024       RE: Abhishek Delatorre  [address]  [facility] [address]     Dear Colleague,    Thank you for referring your patient, Abhishek Delatorre, to the [facility]. Please see a copy of my visit note below.    Subjective    REFERRING PROVIDER  Rafal Penaloza MD    REASON FOR VISIT  Persistent left hydrocele     HISTORY OF PRESENT ILLNESS  Mr. Delatorre is a pleasant 48 year old male with a past medical history significant for unilateral inguinal hernia who presents today for persistent and bothersome left hydrocele.  I personally reviewed the family practice visit note from 8/21/2024 in preparation for today's visit.    According to that note, this has been a finding he is known about for many years with his last testicular ultrasound dating back to June 2021.  At this family practice visit he endorsed continued irritation of this hydrocele during activities such as running and biking.  Ultimately was recommended to follow-up with urology for discussion of intervention.    Today:  In the last few months, began to have some tenderness in the left testicle  Mindful of support when running and being active   No real change int he lat three years   No gross hematuria  No baseline bothersome lower urinary tract symptoms   No history of orchitis     REVIEW OF SYSTEMS  Review of Systems   Constitutional:  Negative for fatigue and unexpected weight change.   HENT:  Negative for ear pain.    Eyes:  Negative for visual disturbance.   Respiratory:  Negative for shortness of breath.    Cardiovascular:  Negative for chest pain.   Gastrointestinal:  Negative for abdominal pain and constipation.   Genitourinary:  Negative for hematuria.   Musculoskeletal:  Negative for back pain (Occasionally wakes up with low back pain).   Neurological:  Negative for dizziness and light-headedness.   Hematological:  Negative for adenopathy.    Psychiatric/Behavioral:  Negative for sleep disturbance.      Social History:  Denies any history of or current smoking     Family History:  Two cousins with history of testicular cancer     Surgical history:  History of bilateral hernia repairs     Objective    PHYSICAL EXAM  Physical Exam  Constitutional:       Appearance: Normal appearance.   HENT:      Head: Normocephalic and atraumatic.      Nose: No congestion.      Mouth/Throat:      Mouth: Mucous membranes are moist.   Eyes:      General:         Right eye: No discharge.         Left eye: No discharge.   Pulmonary:      Effort: No respiratory distress.   Abdominal:      General: There is no distension.   Musculoskeletal:         General: No deformity.   Skin:     General: Skin is warm and dry.   Neurological:      Mental Status: He is alert and oriented to person, place, and time.   Psychiatric:         Mood and Affect: Mood normal.         Behavior: Behavior normal.       LABORATORY   Latest Reference Range & Units 08/21/24 14:37   Color Urine Colorless, Straw, Light Yellow, Yellow  Yellow   Appearance Urine Clear  Clear   Glucose Urine Negative mg/dL Negative   Bilirubin Urine Negative  Negative   Ketones Urine Negative mg/dL Trace !   Specific Gravity Urine 1.003 - 1.035  1.020   pH Urine 5.0 - 7.0  6.5   Protein Albumin Urine Negative mg/dL Negative   Urobilinogen Urine 0.2, 1.0 E.U./dL 0.2   Nitrite Urine Negative  Negative   Blood Urine Negative  Negative   Leukocyte Esterase Urine Negative  Negative   WBC Urine 0-5 /HPF /HPF 0-5   RBC Urine 0-2 /HPF /HPF 0-2   Bacteria Urine None Seen /HPF None Seen   Squamous Epithelial /LPF Urine None Seen /LPF Few !   !: Data is abnormal    Lab Results   Component Value Date/Time    PSA 0.42 08/21/2024 02:31 PM     IMAGING  Previous testicular ultrasound from 6/30/2021    Narrative & Impression   EXAMINATION: US TESTICULAR AND SCROTUM WITH DOPPLER LIMITED, 6/30/2021  10:45 AM      COMPARISON: 10/29/2010      HISTORY: Enlarged testicle     TECHNIQUE: The was scanned in standard fashion with specialized  ultrasound transducer(s) using grey scale, color Doppler, and spectral  flow  techniques.     Findings:  The right testis demonstrates normal echotexture and vascularity. Mild  parenchymal striations of the left testicle. Tiny echogenic foci in  the left mediastinum testis, likely inspissated material within the  seminiferous tubules. Unchanged tiny benign echogenic focus in the  peripheral upper right testicle, of no clinical significance. No focal  suspicious testicular lesion. The right testicle measures 3.4 x 4.6 x  2.7 cm and the left measures 3.2 x 4.9 x 3.3 cm.      Moderate left hydrocele. No varicocele.     Both the right and left epididymis are within normal limits.                                                                         Impression:   1. Moderate left hydrocele.  2. Mildly striated left testicular parenchyma suggests prior  infectious, inflammatory, or ischemic insult. Currently bilateral  testicular vascularity is normal without evidence of testicular  inflammation.  3. No suspicious testicular lesion.     Assessment & Plan  Persistent left hydrocele    It was my pleasure to meet with Mr. eDlatorre in the office today in regards to his chronic hydrocele. We reviewed the natural history of hydroceles, potential contributing causes, and management options. In the far majority of cases, hydroceles are benign conditions and are not associated with any testicular or scrotal pain. We noted that the progression in size of hydroceles is unpredictable, and many will remain the same size for years, while others may continually expand with time. We discussed that the treatment of the hydrocele is based on the extent of bother caused by the size and bulk of the hydrocele itself. If conservative measures are insufficient, an outpatient surgical procedure is most often used for definitive therapy. Potential  risks with surgery include a small risk for decreased fertility, potential injury to the testicle itself or surrounding structures, as well as the possibility for infection, recurrence of the fluid collection, or development of a temporary blood collection in the scrotum which may resolve spontaneously or require a repeat procedure to adequately drain.  Finally, we reviewed the risks of chronic scrotal content pain as well as the very very low risk of losing the testicle.    After reviewing the above information as well as the differences between aspiration and sclerosis and hydrocelectomy, Abhishek expressed the desire to move forward with a hydrocelectomy first available.  I will have him get an up-to-date testicular ultrasound first to confirm anatomy and any changes since 2021, and I will send a message to Dr. Cameron for discussion of scheduling either an office visit for physical exam or surgical listing.    Mr. Delatorre expressed understanding and agreement to the above discussion and plan and all of his questions were answered to his satisfaction.     PLAN  Desire for hydrocelectomy  First available testicular ultrasound    Signed by:    Ryland Seth PA-C      Again, thank you for allowing me to participate in the care of your patient.      Sincerely,    Ryland Seth PA-C

## 2024-09-11 NOTE — NURSING NOTE
Chief Complaint   Patient presents with    Consult     Pt is here for left hydrocele     Pt expressed he had felt some tenderness one morning, several months ago was the onset and has not grown. Looking into options, surgical or what not.    Height 1.829 m (6'), weight 81.6 kg (180 lb). Body mass index is 24.41 kg/m .    Patient Active Problem List   Diagnosis    Unilateral inguinal hernia without obstruction or gangrene, recurrence not specified    Blepharitis of left eye       Allergies   Allergen Reactions    Sulfa Antibiotics      Hives and fatigue       Current Outpatient Medications   Medication Sig Dispense Refill    Eyelid Cleansers (OCUSOFT EYELID CLEANSING EX) Externally apply 1 Application topically 2 times daily      metroNIDAZOLE (METROCREAM) 0.75 % external cream Apply topically daily      atovaquone-proguanil (MALARONE) 250-100 MG tablet Take 1 tablet by mouth daily Start 2 days before exposure to Malaria and continue daily till  7 days after exposure. 15 tablet 0    ondansetron (ZOFRAN ODT) 4 MG ODT tab Take 1 tablet (4 mg) by mouth every 8 hours as needed for nausea or vomiting 5 tablet 0       Social History     Tobacco Use    Smoking status: Never    Smokeless tobacco: Never   Vaping Use    Vaping status: Never Used   Substance Use Topics    Alcohol use: Yes     Comment: socially - Weekends    Drug use: No       Daljit Neumann  9/11/2024  2:15 PM

## 2024-09-11 NOTE — Clinical Note
Jordi Meza,  This is a gentleman who is an  here at [employer] who is interested in a left sided hydrocelectomy.  I walked him through the risks and benefits of both a aspiration and sclerosis and hydrocelectomy and ultimately he would like to proceed with that hydrocelectomy.  Do you want a visit with him for physical exam and discussion?  I am already having him get an up-to-date testicular ultrasound.  Thank you!  Derrick

## 2024-09-16 ENCOUNTER — ANCILLARY PROCEDURE (OUTPATIENT)
Dept: ULTRASOUND IMAGING | Facility: CLINIC | Age: 49
End: 2024-09-16
Attending: STUDENT IN AN ORGANIZED HEALTH CARE EDUCATION/TRAINING PROGRAM
Payer: COMMERCIAL

## 2024-09-16 DIAGNOSIS — N43.3 LEFT HYDROCELE: ICD-10-CM

## 2024-09-16 PROCEDURE — 76870 US EXAM SCROTUM: CPT | Mod: GC | Performed by: STUDENT IN AN ORGANIZED HEALTH CARE EDUCATION/TRAINING PROGRAM

## 2024-09-16 PROCEDURE — 99207 US TESTICULAR AND SCROTUM WITH DOPPLER LIMITED: CPT | Mod: GC | Performed by: STUDENT IN AN ORGANIZED HEALTH CARE EDUCATION/TRAINING PROGRAM

## 2024-09-21 DIAGNOSIS — N43.2 OTHER HYDROCELE: Primary | ICD-10-CM

## 2024-09-21 RX ORDER — CEFAZOLIN SODIUM 2 G/50ML
2 SOLUTION INTRAVENOUS
OUTPATIENT
Start: 2024-09-21

## 2024-09-21 RX ORDER — ACETAMINOPHEN 650 MG/1
650 SUPPOSITORY RECTAL ONCE
OUTPATIENT
Start: 2024-09-21

## 2024-09-21 RX ORDER — CEFAZOLIN SODIUM 2 G/50ML
2 SOLUTION INTRAVENOUS SEE ADMIN INSTRUCTIONS
OUTPATIENT
Start: 2024-09-21

## 2024-09-21 RX ORDER — ACETAMINOPHEN 325 MG/1
975 TABLET ORAL ONCE
OUTPATIENT
Start: 2024-09-21 | End: 2024-09-21

## 2024-09-23 ENCOUNTER — TELEPHONE (OUTPATIENT)
Dept: UROLOGY | Facility: CLINIC | Age: 49
End: 2024-09-23
Payer: COMMERCIAL

## 2024-09-23 NOTE — TELEPHONE ENCOUNTER
Called and LVM for patient to schedule surgery with Dr. Cameron. Provided direct call back number [phone].      Jamia Collier on 9/23/2024 at 8:50 AM

## 2024-09-27 NOTE — TELEPHONE ENCOUNTER
Called and LVM for patient to schedule surgery with Dr. Cameron. Provided direct call back number [phone].      Jamia Collier on 9/27/2024 at 11:48 AM

## 2024-09-30 ENCOUNTER — HOSPITAL ENCOUNTER (OUTPATIENT)
Facility: CLINIC | Age: 49
End: 2024-09-30
Attending: UROLOGY | Admitting: UROLOGY
Payer: COMMERCIAL

## 2024-09-30 PROBLEM — N43.2 OTHER HYDROCELE: Status: ACTIVE | Noted: 2024-09-21

## 2024-11-03 NOTE — PROGRESS NOTES
AUDIOLOGY REPORT    SUBJECTIVE:  Abhishek Delatorre is a 48 year old male who was seen on 11/14/24 in the Audiology Clinic at the [facility] for audiologic evaluation, referred by Rafal Penaloza M.D.     The patient reports difficulty hearing people who are soft spoken (especially his teenage daughter), hearing in restaurants and understanding TV.  Abhishek reports that he had tubes in his ears as an infant. He had exposure to loud concerts and lawn mowing without hearing protection in teens/young adulthood, but he now uses hearing protection.  Abhishek denies tinnitus, dizziness, ear pain, drainage from ears, or aural fullness.     OBJECTIVE:  Abuse Screening:  Do you feel unsafe at home or work/school? No  Do you feel threatened by someone? No  Does anyone try to keep you from having contact with others, or doing things outside of your home? No  Physical signs of abuse present? No     Fall Risk Screen:  1. Have you fallen two or more times in the past year? No  2. Have you fallen and had an injury in the past year? No    Timed Up and Go Score (in seconds): not tested  Is patient a fall risk? No  Referral initiated: No  Fall Risk Assessment Completed by Audiology    Otoscopic exam indicates ears are clear of cerumen bilaterally.     Pure Tone Thresholds assessed using conventional audiometry with good reliability from 250-8000 Hz bilaterally using insert earphones and circumaural headphones.     RIGHT:  Normal hearing except at 1753-5054 Hz, where there is a mild sensorineural hearing loss.     LEFT:   Normal hearing except at 0249-8935 Hz, where there is a mild sensorineural hearing loss.    Tympanogram:    RIGHT: slightly hypermobile eardrum mobility     LEFT: hypermobile eardrum mobility     Reflexes (reported by stimulus ear):  RIGHT: Ipsilateral is present at normal levels  RIGHT: Contralateral is present at normal levels  LEFT:   Ipsilateral is present at normal  levels  LEFT:   Contralateral is present at normal levels      Speech Reception Threshold:    RIGHT: 15 dB HL    LEFT:   15 dB HL  Word Recognition Score:     RIGHT: 100% at 60 dB HL using NU-6 recorded word list.    LEFT:   96% at 60 dB HL using NU-6 recorded word list.      ASSESSMENT:   Mild sensorineural hearing loss from 4777-4546 Hz bilaterally.     Today s results were discussed with the patient in detail.     PLAN:    Recheck hearing if changes.    Hearing aid trial if patient interest/motivation.  Patient was given a hard copy of today's results.      Provided handout on communication strategies.    Continued use of hearing protection.    Follow up with PCP.     The patient expressed understanding and agreement with this plan.    Oren Almaraz, CCC-A, [facility]  Licensed Audiologist  [place] #[id number]     Rafal Penaloza M.D.

## 2024-11-14 ENCOUNTER — OFFICE VISIT (OUTPATIENT)
Dept: AUDIOLOGY | Facility: CLINIC | Age: 49
End: 2024-11-14
Payer: COMMERCIAL

## 2024-11-14 DIAGNOSIS — H91.90 DECREASED HEARING, UNSPECIFIED LATERALITY: ICD-10-CM

## 2024-11-14 DIAGNOSIS — H90.3 SENSORINEURAL HEARING LOSS (SNHL) OF BOTH EARS: Primary | ICD-10-CM

## 2024-12-13 PROBLEM — K40.90 UNILATERAL INGUINAL HERNIA WITHOUT OBSTRUCTION OR GANGRENE, RECURRENCE NOT SPECIFIED: Status: RESOLVED | Noted: 2019-11-19 | Resolved: 2024-12-13

## 2025-01-20 ENCOUNTER — TELEPHONE (OUTPATIENT)
Dept: UROLOGY | Facility: CLINIC | Age: 50
End: 2025-01-20
Payer: COMMERCIAL

## 2025-01-20 NOTE — TELEPHONE ENCOUNTER
Patient calling to see what his instructions are for his procedure. Pre-op physical done last week. Discuss NPO status day of procedure.  Spouse will provide transportation and stay with him the first twenty four hours post procedure     Shelly Carrion RN, BSN, PHN  Care Coordinator Urology  [facility] [place]  Urology Clinic  [phone]

## 2025-01-23 ENCOUNTER — ANESTHESIA EVENT (OUTPATIENT)
Dept: SURGERY | Facility: CLINIC | Age: 50
End: 2025-01-23
Payer: COMMERCIAL

## 2025-01-23 NOTE — ANESTHESIA PREPROCEDURE EVALUATION
Anesthesia Pre-Procedure Evaluation    Patient: Abhishek Delatorre   MRN: [id number] : 1975        Procedure : Procedure(s):  HYDROCELECTOMY, LEFT SCROTAL APPROACH          History reviewed. No pertinent past medical history.   Past Surgical History:   Procedure Laterality Date    AS REPAIR SLIDING INGUINAL HERNIA Left     DAVINCI HERNIORRHAPHY INGUINAL Right 2020    Procedure: HERNIORRHAPHY, INGUINAL, ROBOT-ASSISTED, WITH MESH, RIGHT;  Surgeon: Shaggy Sevilla MD;  Location: [facility] OR    HERNIA REPAIR  2007, 2020    left-side, right-side      Allergies   Allergen Reactions    Sulfa Antibiotics      Hives and fatigue      Social History     Tobacco Use    Smoking status: Never     Passive exposure: Never    Smokeless tobacco: Never   Substance Use Topics    Alcohol use: Yes     Comment: socially - Weekends      Wt Readings from Last 1 Encounters:   25 84.5 kg (186 lb 3.2 oz)        Anesthesia Evaluation   Pt has had prior anesthetic. Type: General.    No history of anesthetic complications       ROS/MED HX  ENT/Pulmonary:  - neg pulmonary ROS     Neurologic:  - neg neurologic ROS     Cardiovascular:  - neg cardiovascular ROS     METS/Exercise Tolerance: >4 METS    Hematologic:  - neg hematologic  ROS     Musculoskeletal:  - neg musculoskeletal ROS     GI/Hepatic:  - neg GI/hepatic ROS     Renal/Genitourinary: Comment: Hydocele (left)      Endo:  - neg endo ROS     Psychiatric/Substance Use:  - neg psychiatric ROS     Infectious Disease:  - neg infectious disease ROS     Malignancy:       Other:            Physical Exam    Airway        Mallampati: I   TM distance: > 3 FB   Neck ROM: full   Mouth opening: > 3 cm    Respiratory Devices and Support         Dental       (+) Completely normal teeth      Cardiovascular   cardiovascular exam normal          Pulmonary   pulmonary exam normal                OUTSIDE LABS:  CBC:   Lab Results   Component Value Date    WBC 4.9 2024    WBC 6.3  "04/11/2023    HGB 15.3 12/13/2024    HGB 15.4 04/11/2023    HCT 47.6 12/13/2024    HCT 46.5 04/11/2023     12/13/2024     04/11/2023     BMP:   Lab Results   Component Value Date     12/13/2024     08/21/2024    POTASSIUM 4.2 12/13/2024    POTASSIUM 4.4 08/21/2024    CHLORIDE 103 12/13/2024    CHLORIDE 104 08/21/2024    CO2 27 12/13/2024    CO2 26 08/21/2024    BUN 12.9 12/13/2024    BUN 14.4 08/21/2024    CR 0.84 12/13/2024    CR 0.84 08/21/2024    GLC 94 12/13/2024    GLC 94 08/21/2024     COAGS: No results found for: \"PTT\", \"INR\", \"FIBR\"  POC:   Lab Results   Component Value Date    BGM 96 01/09/2020     HEPATIC:   Lab Results   Component Value Date    ALBUMIN 4.6 12/13/2024    PROTTOTAL 7.5 12/13/2024    ALT 16 12/13/2024    AST 30 12/13/2024    ALKPHOS 65 12/13/2024    BILITOTAL 1.0 12/13/2024     OTHER:   Lab Results   Component Value Date    A1C 4.7 08/21/2024    RENATA 9.6 12/13/2024       Anesthesia Plan    ASA Status:  1    NPO Status:  NPO Appropriate    Anesthesia Type: General.     - Airway: LMA   Induction: Intravenous, Propofol.   Maintenance: Balanced.        Consents    Anesthesia Plan(s) and associated risks, benefits, and realistic alternatives discussed. Questions answered and patient/representative(s) expressed understanding.     - Discussed: Risks, Benefits and Alternatives for BOTH SEDATION and the PROCEDURE were discussed     - Discussed with:  Patient      - Extended Intubation/Ventilatory Support Discussed: No.      - Patient is DNR/DNI Status: No     Use of blood products discussed: No .     Postoperative Care    Pain management: IV analgesics, Oral pain medications, Multi-modal analgesia.   PONV prophylaxis: Dexamethasone or Solumedrol, Ondansetron (or other 5HT-3), Background Propofol Infusion     Comments:               Dustin Arroyo MD    I have reviewed the pertinent notes and labs in the chart from the past 30 days and (re)examined the patient.  Any updates or " "changes from those notes are reflected in this note.    Clinically Significant Risk Factors Present on Admission                             # Overweight: Estimated body mass index is 25.67 kg/m  as calculated from the following:    Height as of 1/17/25: 1.814 m (5' 11.42\").    Weight as of 1/17/25: 84.5 kg (186 lb 3.2 oz).                "

## 2025-01-24 ENCOUNTER — HOSPITAL ENCOUNTER (OUTPATIENT)
Facility: CLINIC | Age: 50
Discharge: HOME | End: 2025-01-24
Attending: UROLOGY | Admitting: UROLOGY
Payer: COMMERCIAL

## 2025-01-24 ENCOUNTER — ANESTHESIA (OUTPATIENT)
Dept: SURGERY | Facility: CLINIC | Age: 50
End: 2025-01-24
Payer: COMMERCIAL

## 2025-01-24 VITALS
BODY MASS INDEX: 24.92 KG/M2 | TEMPERATURE: 98.1 F | HEART RATE: 72 BPM | OXYGEN SATURATION: 99 % | HEIGHT: 71 IN | WEIGHT: 178 LBS | RESPIRATION RATE: 12 BRPM | SYSTOLIC BLOOD PRESSURE: 123 MMHG | DIASTOLIC BLOOD PRESSURE: 69 MMHG

## 2025-01-24 DIAGNOSIS — N43.2 OTHER HYDROCELE: Primary | ICD-10-CM

## 2025-01-24 PROCEDURE — 710N000012 HC RECOVERY PHASE 2, PER MINUTE: Performed by: UROLOGY

## 2025-01-24 PROCEDURE — 250N000025 HC SEVOFLURANE, PER MIN: Performed by: UROLOGY

## 2025-01-24 PROCEDURE — 54640 ORCHIOPEXY INGUN/SCROT APPR: CPT | Mod: LT | Performed by: UROLOGY

## 2025-01-24 PROCEDURE — 272N000001 HC OR GENERAL SUPPLY STERILE: Performed by: UROLOGY

## 2025-01-24 PROCEDURE — 999N000141 HC STATISTIC PRE-PROCEDURE NURSING ASSESSMENT: Performed by: UROLOGY

## 2025-01-24 PROCEDURE — 250N000009 HC RX 250

## 2025-01-24 PROCEDURE — 370N000017 HC ANESTHESIA TECHNICAL FEE, PER MIN: Performed by: UROLOGY

## 2025-01-24 PROCEDURE — 250N000011 HC RX IP 250 OP 636: Performed by: UROLOGY

## 2025-01-24 PROCEDURE — 250N000011 HC RX IP 250 OP 636

## 2025-01-24 PROCEDURE — 258N000003 HC RX IP 258 OP 636: Performed by: STUDENT IN AN ORGANIZED HEALTH CARE EDUCATION/TRAINING PROGRAM

## 2025-01-24 PROCEDURE — 55040 REMOVAL OF HYDROCELE: CPT | Mod: LT | Performed by: UROLOGY

## 2025-01-24 PROCEDURE — 360N000075 HC SURGERY LEVEL 2, PER MIN: Performed by: UROLOGY

## 2025-01-24 PROCEDURE — 250N000013 HC RX MED GY IP 250 OP 250 PS 637: Performed by: UROLOGY

## 2025-01-24 PROCEDURE — 710N000010 HC RECOVERY PHASE 1, LEVEL 2, PER MIN: Performed by: UROLOGY

## 2025-01-24 PROCEDURE — 258N000003 HC RX IP 258 OP 636

## 2025-01-24 RX ORDER — ONDANSETRON 2 MG/ML
INJECTION INTRAMUSCULAR; INTRAVENOUS PRN
Status: DISCONTINUED | OUTPATIENT
Start: 2025-01-24 | End: 2025-01-24

## 2025-01-24 RX ORDER — ALBUTEROL SULFATE 0.83 MG/ML
2.5 SOLUTION RESPIRATORY (INHALATION) EVERY 4 HOURS PRN
Status: DISCONTINUED | OUTPATIENT
Start: 2025-01-24 | End: 2025-01-24 | Stop reason: HOSPADM

## 2025-01-24 RX ORDER — PROPOFOL 10 MG/ML
INJECTION, EMULSION INTRAVENOUS CONTINUOUS PRN
Status: DISCONTINUED | OUTPATIENT
Start: 2025-01-24 | End: 2025-01-24

## 2025-01-24 RX ORDER — CEFAZOLIN SODIUM/WATER 2 G/20 ML
2 SYRINGE (ML) INTRAVENOUS SEE ADMIN INSTRUCTIONS
Status: DISCONTINUED | OUTPATIENT
Start: 2025-01-24 | End: 2025-01-24 | Stop reason: HOSPADM

## 2025-01-24 RX ORDER — LIDOCAINE 40 MG/G
CREAM TOPICAL
Status: DISCONTINUED | OUTPATIENT
Start: 2025-01-24 | End: 2025-01-24 | Stop reason: HOSPADM

## 2025-01-24 RX ORDER — HYDRALAZINE HYDROCHLORIDE 20 MG/ML
2.5-5 INJECTION INTRAMUSCULAR; INTRAVENOUS EVERY 10 MIN PRN
Status: DISCONTINUED | OUTPATIENT
Start: 2025-01-24 | End: 2025-01-24 | Stop reason: HOSPADM

## 2025-01-24 RX ORDER — ONDANSETRON 4 MG/1
4 TABLET, ORALLY DISINTEGRATING ORAL EVERY 30 MIN PRN
Status: DISCONTINUED | OUTPATIENT
Start: 2025-01-24 | End: 2025-01-24 | Stop reason: HOSPADM

## 2025-01-24 RX ORDER — EPHEDRINE SULFATE 50 MG/ML
INJECTION, SOLUTION INTRAMUSCULAR; INTRAVENOUS; SUBCUTANEOUS PRN
Status: DISCONTINUED | OUTPATIENT
Start: 2025-01-24 | End: 2025-01-24

## 2025-01-24 RX ORDER — SODIUM CHLORIDE, SODIUM LACTATE, POTASSIUM CHLORIDE, CALCIUM CHLORIDE 600; 310; 30; 20 MG/100ML; MG/100ML; MG/100ML; MG/100ML
INJECTION, SOLUTION INTRAVENOUS CONTINUOUS PRN
Status: DISCONTINUED | OUTPATIENT
Start: 2025-01-24 | End: 2025-01-24

## 2025-01-24 RX ORDER — NALOXONE HYDROCHLORIDE 0.4 MG/ML
0.1 INJECTION, SOLUTION INTRAMUSCULAR; INTRAVENOUS; SUBCUTANEOUS
Status: DISCONTINUED | OUTPATIENT
Start: 2025-01-24 | End: 2025-01-24 | Stop reason: HOSPADM

## 2025-01-24 RX ORDER — METHOCARBAMOL 750 MG/1
750 TABLET, FILM COATED ORAL
Status: CANCELLED | OUTPATIENT
Start: 2025-01-24

## 2025-01-24 RX ORDER — HYDROMORPHONE HYDROCHLORIDE 1 MG/ML
0.4 INJECTION, SOLUTION INTRAMUSCULAR; INTRAVENOUS; SUBCUTANEOUS EVERY 5 MIN PRN
Status: DISCONTINUED | OUTPATIENT
Start: 2025-01-24 | End: 2025-01-24 | Stop reason: HOSPADM

## 2025-01-24 RX ORDER — ACETAMINOPHEN 325 MG/1
975 TABLET ORAL ONCE
Status: DISCONTINUED | OUTPATIENT
Start: 2025-01-24 | End: 2025-01-24 | Stop reason: HOSPADM

## 2025-01-24 RX ORDER — ONDANSETRON 4 MG/1
4 TABLET, ORALLY DISINTEGRATING ORAL
Status: CANCELLED | OUTPATIENT
Start: 2025-01-24

## 2025-01-24 RX ORDER — LABETALOL HYDROCHLORIDE 5 MG/ML
10 INJECTION, SOLUTION INTRAVENOUS
Status: DISCONTINUED | OUTPATIENT
Start: 2025-01-24 | End: 2025-01-24 | Stop reason: HOSPADM

## 2025-01-24 RX ORDER — ONDANSETRON 2 MG/ML
4 INJECTION INTRAMUSCULAR; INTRAVENOUS EVERY 30 MIN PRN
Status: DISCONTINUED | OUTPATIENT
Start: 2025-01-24 | End: 2025-01-24 | Stop reason: HOSPADM

## 2025-01-24 RX ORDER — DIPHENHYDRAMINE HCL 25 MG
25 CAPSULE ORAL EVERY 6 HOURS PRN
Status: DISCONTINUED | OUTPATIENT
Start: 2025-01-24 | End: 2025-01-24 | Stop reason: HOSPADM

## 2025-01-24 RX ORDER — BUPIVACAINE HYDROCHLORIDE 5 MG/ML
INJECTION, SOLUTION PERINEURAL PRN
Status: DISCONTINUED | OUTPATIENT
Start: 2025-01-24 | End: 2025-01-24 | Stop reason: HOSPADM

## 2025-01-24 RX ORDER — DIPHENHYDRAMINE HYDROCHLORIDE 50 MG/ML
25 INJECTION INTRAMUSCULAR; INTRAVENOUS EVERY 6 HOURS PRN
Status: DISCONTINUED | OUTPATIENT
Start: 2025-01-24 | End: 2025-01-24 | Stop reason: HOSPADM

## 2025-01-24 RX ORDER — HYDROXYZINE HYDROCHLORIDE 25 MG/1
25 TABLET, FILM COATED ORAL
Status: CANCELLED | OUTPATIENT
Start: 2025-01-24

## 2025-01-24 RX ORDER — OXYCODONE HYDROCHLORIDE 10 MG/1
10 TABLET ORAL
Status: DISCONTINUED | OUTPATIENT
Start: 2025-01-24 | End: 2025-01-24 | Stop reason: HOSPADM

## 2025-01-24 RX ORDER — HYDROMORPHONE HYDROCHLORIDE 1 MG/ML
0.2 INJECTION, SOLUTION INTRAMUSCULAR; INTRAVENOUS; SUBCUTANEOUS EVERY 5 MIN PRN
Status: DISCONTINUED | OUTPATIENT
Start: 2025-01-24 | End: 2025-01-24 | Stop reason: HOSPADM

## 2025-01-24 RX ORDER — MEPERIDINE HYDROCHLORIDE 25 MG/ML
12.5 INJECTION INTRAMUSCULAR; INTRAVENOUS; SUBCUTANEOUS EVERY 5 MIN PRN
Status: DISCONTINUED | OUTPATIENT
Start: 2025-01-24 | End: 2025-01-24 | Stop reason: HOSPADM

## 2025-01-24 RX ORDER — FENTANYL CITRATE 50 UG/ML
25 INJECTION, SOLUTION INTRAMUSCULAR; INTRAVENOUS
Status: DISCONTINUED | OUTPATIENT
Start: 2025-01-24 | End: 2025-01-24 | Stop reason: HOSPADM

## 2025-01-24 RX ORDER — KETOROLAC TROMETHAMINE 30 MG/ML
15 INJECTION, SOLUTION INTRAMUSCULAR; INTRAVENOUS
Status: DISCONTINUED | OUTPATIENT
Start: 2025-01-24 | End: 2025-01-24 | Stop reason: HOSPADM

## 2025-01-24 RX ORDER — FENTANYL CITRATE 50 UG/ML
50 INJECTION, SOLUTION INTRAMUSCULAR; INTRAVENOUS EVERY 5 MIN PRN
Status: DISCONTINUED | OUTPATIENT
Start: 2025-01-24 | End: 2025-01-24 | Stop reason: HOSPADM

## 2025-01-24 RX ORDER — LIDOCAINE HYDROCHLORIDE 20 MG/ML
INJECTION, SOLUTION INFILTRATION; PERINEURAL PRN
Status: DISCONTINUED | OUTPATIENT
Start: 2025-01-24 | End: 2025-01-24

## 2025-01-24 RX ORDER — DEXAMETHASONE SODIUM PHOSPHATE 4 MG/ML
INJECTION, SOLUTION INTRA-ARTICULAR; INTRALESIONAL; INTRAMUSCULAR; INTRAVENOUS; SOFT TISSUE PRN
Status: DISCONTINUED | OUTPATIENT
Start: 2025-01-24 | End: 2025-01-24

## 2025-01-24 RX ORDER — PROPOFOL 10 MG/ML
INJECTION, EMULSION INTRAVENOUS PRN
Status: DISCONTINUED | OUTPATIENT
Start: 2025-01-24 | End: 2025-01-24

## 2025-01-24 RX ORDER — HYDROXYZINE HYDROCHLORIDE 10 MG/1
10 TABLET, FILM COATED ORAL
Status: CANCELLED | OUTPATIENT
Start: 2025-01-24

## 2025-01-24 RX ORDER — FENTANYL CITRATE 50 UG/ML
INJECTION, SOLUTION INTRAMUSCULAR; INTRAVENOUS PRN
Status: DISCONTINUED | OUTPATIENT
Start: 2025-01-24 | End: 2025-01-24

## 2025-01-24 RX ORDER — OXYCODONE HYDROCHLORIDE 5 MG/1
5 TABLET ORAL EVERY 6 HOURS PRN
Qty: 10 TABLET | Refills: 0 | Status: SHIPPED | OUTPATIENT
Start: 2025-01-24

## 2025-01-24 RX ORDER — OXYCODONE HYDROCHLORIDE 5 MG/1
5 TABLET ORAL
Status: DISCONTINUED | OUTPATIENT
Start: 2025-01-24 | End: 2025-01-24 | Stop reason: HOSPADM

## 2025-01-24 RX ORDER — ACETAMINOPHEN 650 MG/1
650 SUPPOSITORY RECTAL ONCE
Status: COMPLETED | OUTPATIENT
Start: 2025-01-24 | End: 2025-01-24

## 2025-01-24 RX ORDER — DEXAMETHASONE SODIUM PHOSPHATE 4 MG/ML
4 INJECTION, SOLUTION INTRA-ARTICULAR; INTRALESIONAL; INTRAMUSCULAR; INTRAVENOUS; SOFT TISSUE
Status: DISCONTINUED | OUTPATIENT
Start: 2025-01-24 | End: 2025-01-24 | Stop reason: HOSPADM

## 2025-01-24 RX ORDER — SODIUM CHLORIDE, SODIUM LACTATE, POTASSIUM CHLORIDE, CALCIUM CHLORIDE 600; 310; 30; 20 MG/100ML; MG/100ML; MG/100ML; MG/100ML
INJECTION, SOLUTION INTRAVENOUS CONTINUOUS
Status: DISCONTINUED | OUTPATIENT
Start: 2025-01-24 | End: 2025-01-24 | Stop reason: HOSPADM

## 2025-01-24 RX ORDER — ACETAMINOPHEN 325 MG/1
975 TABLET ORAL ONCE
Status: COMPLETED | OUTPATIENT
Start: 2025-01-24 | End: 2025-01-24

## 2025-01-24 RX ORDER — CEFAZOLIN SODIUM/WATER 2 G/20 ML
2 SYRINGE (ML) INTRAVENOUS
Status: DISCONTINUED | OUTPATIENT
Start: 2025-01-24 | End: 2025-01-24 | Stop reason: HOSPADM

## 2025-01-24 RX ORDER — HYDROCODONE BITARTRATE AND ACETAMINOPHEN 5; 325 MG/1; MG/1
1 TABLET ORAL
Status: CANCELLED | OUTPATIENT
Start: 2025-01-24

## 2025-01-24 RX ORDER — FENTANYL CITRATE 50 UG/ML
25 INJECTION, SOLUTION INTRAMUSCULAR; INTRAVENOUS EVERY 5 MIN PRN
Status: DISCONTINUED | OUTPATIENT
Start: 2025-01-24 | End: 2025-01-24 | Stop reason: HOSPADM

## 2025-01-24 RX ORDER — KETOROLAC TROMETHAMINE 30 MG/ML
INJECTION, SOLUTION INTRAMUSCULAR; INTRAVENOUS PRN
Status: DISCONTINUED | OUTPATIENT
Start: 2025-01-24 | End: 2025-01-24

## 2025-01-24 RX ORDER — LORAZEPAM 2 MG/ML
.5-1 INJECTION INTRAMUSCULAR
Status: DISCONTINUED | OUTPATIENT
Start: 2025-01-24 | End: 2025-01-24 | Stop reason: HOSPADM

## 2025-01-24 RX ADMIN — EPHEDRINE SULFATE 5 MG: 5 INJECTION INTRAVENOUS at 14:27

## 2025-01-24 RX ADMIN — ACETAMINOPHEN 975 MG: 325 TABLET, FILM COATED ORAL at 13:33

## 2025-01-24 RX ADMIN — LIDOCAINE HYDROCHLORIDE 80 MG: 20 INJECTION, SOLUTION INFILTRATION; PERINEURAL at 14:06

## 2025-01-24 RX ADMIN — DEXAMETHASONE SODIUM PHOSPHATE 8 MG: 4 INJECTION, SOLUTION INTRAMUSCULAR; INTRAVENOUS at 14:11

## 2025-01-24 RX ADMIN — PROPOFOL 50 MG: 10 INJECTION, EMULSION INTRAVENOUS at 14:07

## 2025-01-24 RX ADMIN — EPHEDRINE SULFATE 5 MG: 5 INJECTION INTRAVENOUS at 14:35

## 2025-01-24 RX ADMIN — ONDANSETRON 4 MG: 2 INJECTION INTRAMUSCULAR; INTRAVENOUS at 15:06

## 2025-01-24 RX ADMIN — SODIUM CHLORIDE, POTASSIUM CHLORIDE, SODIUM LACTATE AND CALCIUM CHLORIDE: 600; 310; 30; 20 INJECTION, SOLUTION INTRAVENOUS at 14:01

## 2025-01-24 RX ADMIN — Medication 2 G: at 14:04

## 2025-01-24 RX ADMIN — PROPOFOL 200 MG: 10 INJECTION, EMULSION INTRAVENOUS at 14:06

## 2025-01-24 RX ADMIN — MIDAZOLAM 2 MG: 1 INJECTION INTRAMUSCULAR; INTRAVENOUS at 14:01

## 2025-01-24 RX ADMIN — PROPOFOL 25 MCG/KG/MIN: 10 INJECTION, EMULSION INTRAVENOUS at 14:11

## 2025-01-24 RX ADMIN — FENTANYL CITRATE 100 MCG: 50 INJECTION INTRAMUSCULAR; INTRAVENOUS at 14:06

## 2025-01-24 RX ADMIN — EPHEDRINE SULFATE 5 MG: 5 INJECTION INTRAVENOUS at 15:01

## 2025-01-24 RX ADMIN — KETOROLAC TROMETHAMINE 15 MG: 30 INJECTION, SOLUTION INTRAMUSCULAR at 15:12

## 2025-01-24 RX ADMIN — SODIUM CHLORIDE, POTASSIUM CHLORIDE, SODIUM LACTATE AND CALCIUM CHLORIDE: 600; 310; 30; 20 INJECTION, SOLUTION INTRAVENOUS at 13:36

## 2025-01-24 ASSESSMENT — ACTIVITIES OF DAILY LIVING (ADL)
ADLS_ACUITY_SCORE: 35

## 2025-01-24 NOTE — OP NOTE
OPERATIVE NOTE    PREOPERATIVE DIAGNOSIS: {RIGHT OR LEFT:946854} hydrocele  POSTOPERATIVE DIAGNOSIS: same    PROCEDURES PERFORMED:   1. {RIGHT OR LEFT:728613} hydrocelectomy    STAFF SURGEON: ***, MD  RESIDENT(S): Aditi Blum MD  ANESTHESIA: GET    ESTIMATED BLOOD LOSS: *** mL.   IV FLUIDS: see dictated anesthesia record  COMPLICATIONS: None.     SPECIMEN:    ***{RIGHT OR LEFT:470770} ***    SIGNIFICANT FINDINGS:   ***    BRIEF OPERATIVE INDICATIONS: Abhishek Delatorre is a 49 year old year old male with a {RIGHT OR LEFT:514638} hydrocele, diagnosed on ultrasound***.    SUMMARY OF PROCEDURE:   After informed consent was obtained, the patient was taken to the operating room and placed supine. Boots were applied, preoperative antibiotics were administered IV, and the genitals were prepped and draped in the supine position. A timeout was performed with the operating room staff to confirm patient, procedure, and laterality.     The case was begun by marking a midline raphe incision immediately inferior to the penoscrotal junction. The skin incised sharply and was opened full-thickness exposing the right testicle and tunica albuginea. The tunica albuginea attachments were taken down using a combination of blunt dissection and electrocautery, and the testicle was delivered from the scrotum. The tunica vaginalis was opened and the hydrocele was drained. The hydrocele sac was opened and everted; the redundant tissue was excised and the edges were oversown with a running intermittently locking 3-0 chromic. We performed thorough inspection for hemostasis before beginning the closure. The Dartos muscle was re-approximated in two layers using a running 3-0 Vicryl. The skin was closed using 4-0 monocryl in a running horizontal mattress fashion.     Bacitracin, floss, and a scrotal supporter were applied. The patient was awoken from anesthesia without complication and transported to recovery in stable condition.     POST-OP PLAN:    - Discharge today if meeting PACU criteria   - Post-op incision check in 2 weeks

## 2025-01-24 NOTE — ANESTHESIA PROCEDURE NOTES
Airway       Patient location during procedure: OR  Staff -        Anesthesiologist:  Vanessa Bullard MD       CRNA: Priya Simpson APRN CRNA       Performed By: CRNA  Consent for Airway        Urgency: elective  Indications and Patient Condition       Indications for airway management: sheba-procedural       Induction type:intravenous       Mask difficulty assessment: 1 - vent by mask    Final Airway Details       Final airway type: supraglottic airway    Supraglottic Airway Details        Type: LMA       Brand: Air-Q       LMA size: 4    Post intubation assessment        Placement verified by: capnometry, equal breath sounds and chest rise        Number of attempts at approach: 1       Number of other approaches attempted: 0       Secured with: tape       Ease of procedure: easy       Dentition: Intact and Unchanged

## 2025-01-24 NOTE — ANESTHESIA PROCEDURE NOTES
Airway       Patient location during procedure: OR       Procedure Start/Stop Times: 1/24/2025 2:05 AM  Staff -        Anesthesiologist:  Vanessa Bullard MD       CRNA: Priya Simpson APRN CRNA       Performed By: CRNA  Consent for Airway        Urgency: elective  Indications and Patient Condition       Indications for airway management: sheba-procedural         Mask difficulty assessment: 1 - vent by mask    Final Airway Details       Final airway type: supraglottic airway    Supraglottic Airway Details        Type: LMA       Brand: Air-Q       LMA size: 4    Post intubation assessment        Placement verified by: capnometry, equal breath sounds and chest rise        Number of attempts at approach: 1       Number of other approaches attempted: 0       Secured with: tape       Ease of procedure: easy       Dentition: Intact and Unchanged    Medication(s) Administered   Medication Administration Time: 1/24/2025 2:05 AM

## 2025-01-24 NOTE — ANESTHESIA CARE TRANSFER NOTE
Patient: Abhishek Delatorre    Procedure: Procedure(s):  HYDROCELECTOMY, LEFT SCROTAL APPROACH WITH LEFT ORCHIOPEXY       Diagnosis: Other hydrocele [N43.2]  Diagnosis Additional Information: No value filed.    Anesthesia Type:   General     Note:    Oropharynx: oropharynx clear of all foreign objects and spontaneously breathing  Level of Consciousness: awake  Oxygen Supplementation: face mask  Level of Supplemental Oxygen (L/min / FiO2): 6  Independent Airway: airway patency satisfactory and stable  Dentition: dentition unchanged  Vital Signs Stable: post-procedure vital signs reviewed and stable  Report to RN Given: handoff report given  Patient transferred to: PACU    Handoff Report: Identifed the Patient, Identified the Reponsible Provider, Reviewed the pertinent medical history, Discussed the surgical course, Reviewed Intra-OP anesthesia mangement and issues during anesthesia, Set expectations for post-procedure period and Allowed opportunity for questions and acknowledgement of understanding      Vitals:  Vitals Value Taken Time   /91 01/24/25 1523   Temp 36.7    Pulse 61 01/24/25 1527   Resp 11 01/24/25 1527   SpO2 100 % 01/24/25 1527   Vitals shown include unfiled device data.    Electronically Signed By: MARGE Montes CRNA  January 24, 2025  3:27 PM

## 2025-01-24 NOTE — BRIEF OP NOTE
[facility]    Brief Operative Note    Pre-operative diagnosis: Other hydrocele [N43.2]  Post-operative diagnosis Same as pre-operative diagnosis    Procedure: HYDROCELECTOMY, LEFT SCROTAL APPROACH WITH LEFT ORCHIOPEXY, Left - Scrotum    Surgeon: Surgeons and Role:     * Ryland Cameron MD - Primary     * Pepe Dumas MD - Resident - Assisting  Anesthesia: General   Estimated Blood Loss: Minimal    Drains: Fransisco-Webb  Specimens: * No specimens in log *  Findings:   None.  Complications: None.  Implants: * No implants in log *

## 2025-01-24 NOTE — DISCHARGE INSTRUCTIONS
Hydrocelectomy Discharge Instructions:  Bacitracin or similar ointment to incision twice daily x 1 week.  Keep scrotal support x 2 weeks.  Light duty lift/push/pull <20 lbs x 2 weeks.  Shower OK tomorrow.  No soaking bath x 2 weeks.    Drain Removal:  Remove drain tomorrow if drain outputs < 50 ml  Instructions to remove drain at home: 1) unplug drain from tubing 2) Cut the stitch at skin level/coming out of your skin 3) remove drain by pulling it slowly

## 2025-01-25 NOTE — OP NOTE
OPERATIVE NOTE     PREOPERATIVE DIAGNOSIS: Left hydrocele.  POSTOPERATIVE DIAGNOSIS: same    PROCEDURES PERFORMED:   1.  Left scrotal hydrocelectomy  2.  Left orchiopexy    STAFF SURGEON: Dr. Ryland Cameron, present for entire case.   RESIDENT(S):   ANESTHESIA: general  ESTIMATED BLOOD LOSS: 5 mL.   IV FLUIDS: see anesthesia record  COMPLICATIONS: None.    SPECIMEN:  none .      SIGNIFICANT FINDINGS:   LEFT hydrocele with ~100 mL clear [place] fluid drained from hydrocele sac.    BRIEF OPERATIVE INDICATIONS: Abhishek Delatorre is a 49 year old male with history of left hydrocele. Patient presented for evaluation of persistent symptomatic hydrocele. After discussion of all risks, benefits and alternatives, the patient elected to proceed with the procedure as listed above     Procedure in Detail: After informed consent was obtained, the patient was taken to the operating room and placed supine. Pneumoboots were applied, preoperative antibiotics were administered IV, and the genitals were prepped and draped in the supine position. A timeout was performed with the operating room staff.     The case was begun by marking a midline raphe incision immediately inferior to the penoscrotal junction. 0.5% Marcaine was injected prior to making the incision to numb the skin and for a spermatic cord block.  The skin was incised sharply and was opened full-thickness exposing the tunica vaginalis. The tunica vaginalis was partially drained and delivered.  The tunica vaginalis was opened and the hydrocele was drained with ~ 100ml of total fluid drained.  A vertical incision was made in the vaginalis, and the cut edges of the sack were over-sewn in Jaboulay fashion with a running intermittently locking 3-0 Vicryl.  Care was taken to make sure there was no compression at all on the spermatic cord.    The left testicle was then pexied in 3 points to the dartos, at the inferior, medial, and lateral sides using 3-0 vicryl.    A 10F round APOLLO drain  was placed in the upper left scrotum and secured to the skin using a nylon drain stitch.  The scrotum was irrigated with saline.  Hemostasis was ensured, and the incision was closed in layers. The dartos muscle was re-approximated in 2 layers, using running 3-0 Vicryl. Skin was closed using 4-0 Monocryl in a running horizontal mattress fashion.      Bacitracin, fluffs, and a supporter were applied. The patient was awoken from anesthesia without complication and transported to recovery in stable condition.     Plan:  - Okay to discharge from PACU once meeting criteria  - Drain to be removed at home tomorrow if drain outputs minimal  - Follow up in clinic with Dr. Cameron in 2 weeks for wound check     Pepe Dumas MD, MPH  Urology Resident, PGY-2    I was present and scrubbed for the entire procedure.  Standard left hydrocele repair.  Ryland Cameron MD  Urology Staff

## 2025-01-27 NOTE — ANESTHESIA POSTPROCEDURE EVALUATION
Patient: Abhishek Delatorre    Procedure: Procedure(s):  HYDROCELECTOMY, LEFT SCROTAL APPROACH WITH LEFT ORCHIOPEXY       Anesthesia Type:  General    Note:  Disposition: Outpatient   Postop Pain Control: Uneventful            Sign Out: Well controlled pain   PONV: No   Neuro/Psych: Uneventful            Sign Out: Acceptable/Baseline neuro status   Airway/Respiratory: Uneventful            Sign Out: Acceptable/Baseline resp. status   CV/Hemodynamics: Uneventful            Sign Out: Acceptable CV status; No obvious hypovolemia; No obvious fluid overload   Other NRE: NONE   DID A NON-ROUTINE EVENT OCCUR? No           Last vitals:  Vitals Value Taken Time   /69 01/24/25 1546   Temp 36.7  C (98.1  F) 01/24/25 1524   Pulse 65 01/24/25 1541   Resp 13 01/24/25 1541   SpO2 100 % 01/24/25 1555   Vitals shown include unfiled device data.    Electronically Signed By: Vanessa Bullard MD  January 26, 2025  9:30 PM

## 2025-06-23 NOTE — PROGRESS NOTES
SUBJECTIVE:   CC: Abhishek Delatorre is an 45 year old male who presents for preventative health visit.       Patient has been advised of split billing requirements and indicates understanding: Yes  Healthy Habits:     Getting at least 3 servings of Calcium per day:  Yes    Bi-annual eye exam:  Yes    Dental care twice a year:  Yes    Sleep apnea or symptoms of sleep apnea:  None    Diet:  Regular (no restrictions)    Frequency of exercise:  4-5 days/week    Duration of exercise:  30-45 minutes    Taking medications regularly:  Yes    Medication side effects:  Not applicable    PHQ-2 Total Score: 0    Additional concerns today:  Yes          Today's PHQ-2 Score:   PHQ-2 ( 1999 [employer]) 6/28/2021   Q1: Little interest or pleasure in doing things 0   Q2: Feeling down, depressed or hopeless 0   PHQ-2 Score 0   Q1: Little interest or pleasure in doing things Not at all   Q2: Feeling down, depressed or hopeless Not at all   PHQ-2 Score 0       Abuse: Current or Past(Physical, Sexual or Emotional)- No  Do you feel safe in your environment? Yes    Have you ever done Advance Care Planning? (For example, a Health Directive, POLST, or a discussion with a medical provider or your loved ones about your wishes): Yes, patient states has an Advance Care Planning document and will bring a copy to the clinic.    Social History     Tobacco Use     Smoking status: Never Smoker     Smokeless tobacco: Never Used   Substance Use Topics     Alcohol use: Yes     Comment: socially - Weekends     If you drink alcohol do you typically have >3 drinks per day or >7 drinks per week? No    Alcohol Use 6/28/2021   Prescreen: >3 drinks/day or >7 drinks/week? No   Prescreen: >3 drinks/day or >7 drinks/week? -       Last PSA: No results found for: PSA    Reviewed orders with patient. Reviewed health maintenance and updated orders accordingly - Yes  Lab work is in process  Labs reviewed in EPIC  BP Readings from Last 3 Encounters:   06/28/21 116/67    01/09/20 131/83   01/06/20 116/79    Wt Readings from Last 3 Encounters:   06/28/21 80.3 kg (177 lb)   01/09/20 79.8 kg (175 lb 14.8 oz)   01/06/20 82.1 kg (181 lb)                  Patient Active Problem List   Diagnosis     CARDIOVASCULAR SCREENING; LDL GOAL LESS THAN 160     Unilateral inguinal hernia without obstruction or gangrene, recurrence not specified     Blepharitis of left eye     Past Surgical History:   Procedure Laterality Date     AS REPAIR SLIDING INGUINAL HERNIA Left 2008     DAVINCI HERNIORRHAPHY INGUINAL Right 1/9/2020    Procedure: HERNIORRHAPHY, INGUINAL, ROBOT-ASSISTED, WITH MESH, RIGHT;  Surgeon: Shaggy Sevilla MD;  Location: [facility] OR     HERNIA REPAIR  12/2007, 01/2020    left-side, right-side       Social History     Tobacco Use     Smoking status: Never Smoker     Smokeless tobacco: Never Used   Substance Use Topics     Alcohol use: Yes     Comment: socially - Weekends     Family History   Problem Relation Age of Onset     Alcohol/Drug Mother      Heart Murmur Mother      Substance Abuse Mother      Hypertension Father      Alcohol/Drug Paternal Grandmother      Anesthesia Reaction No family hx of      Cardiovascular No family hx of      Deep Vein Thrombosis No family hx of          Current Outpatient Medications   Medication Sig Dispense Refill     Eyelid Cleansers (OCUSOFT EYELID CLEANSING EX) Externally apply 1 Application topically 2 times daily       Multiple Vitamin (MULTI-VITAMIN PO) Take 1 tablet by mouth twice a week        Allergies   Allergen Reactions     Sulfa Drugs      Hives and fatigue     Recent Labs   Lab Test 01/06/20  1038   CR 0.79   GFRESTIMATED >90   GFRESTBLACK >90   POTASSIUM 4.2        Reviewed and updated as needed this visit by clinical staff  Tobacco  Allergies   Problems  Med Hx  Surg Hx           Reviewed and updated as needed this visit by Provider  Tobacco  Allergies   Problems  Med Hx  Surg Hx          History reviewed. No pertinent past  "medical history.   Past Surgical History:   Procedure Laterality Date     AS REPAIR SLIDING INGUINAL HERNIA Left 2008     DAVINCI HERNIORRHAPHY INGUINAL Right 1/9/2020    Procedure: HERNIORRHAPHY, INGUINAL, ROBOT-ASSISTED, WITH MESH, RIGHT;  Surgeon: Shaggy Sevilla MD;  Location: [facility] OR     HERNIA REPAIR  12/2007, 01/2020    left-side, right-side       Review of Systems  CONSTITUTIONAL: NEGATIVE for fever, chills, change in weight  INTEGUMENTARY/SKIN: NEGATIVE for worrisome rashes, moles or lesions  EYES: NEGATIVE for vision changes or irritation  ENT: NEGATIVE for ear, mouth and throat problems, scratchy throat  With pollens  RESP: NEGATIVE for significant cough or SOB  CV: NEGATIVE for chest pain, palpitations or peripheral edema  GI: NEGATIVE for nausea, abdominal pain, heartburn, or change in bowel habits   male: negative for dysuria, hematuria, decreased urinary stream, erectile dysfunction, urethral discharge  MUSCULOSKELETAL: NEGATIVE for significant arthralgias or myalgia  NEURO: NEGATIVE for weakness, dizziness or paresthesias  ENDOCRINE: NEGATIVE for temperature intolerance, skin/hair changes  HEME/ALLERGY/IMMUNE: NEGATIVE for bleeding problems  PSYCHIATRIC: NEGATIVE for changes in mood or affect  Runs 12-15 miles a week with pulls up or sits ups.      OBJECTIVE:   /67 (BP Location: Left arm, Patient Position: Chair, Cuff Size: Adult Regular)   Pulse 64   Temp 98  F (36.7  C) (Temporal)   Ht 1.803 m (5' 11\")   Wt 80.3 kg (177 lb)   SpO2 99%   BMI 24.69 kg/m      Physical Exam  GENERAL: healthy, alert and no distress  EYES: Eyes grossly normal to inspection, PERRL and conjunctivae and sclerae normal  HENT: ear canals and TM's normal, nose and mouth without ulcers or lesions  NECK: no adenopathy, no asymmetry, masses, or scars and thyroid normal to palpation  RESP: lungs clear to auscultation - no rales, rhonchi or wheezes  CV: regular rate and rhythm, normal S1 S2, no S3 or S4, no " murmur, click or rub, no peripheral edema and peripheral pulses strong  ABDOMEN: soft, nontender, no hepatosplenomegaly, no masses and bowel sounds normal   (male): normal male genitalia without lesions or urethral discharge, no hernia, enlarged left scrotal testicle,   RECTAL: normal sphincter tone, no rectal masses, prostate normal size, smooth, nontender without nodules or masses  MS: no gross musculoskeletal defects noted, no edema  SKIN: no suspicious lesions or rashes  NEURO: Normal strength and tone, mentation intact and speech normal  PSYCH: mentation appears normal, affect normal/bright  LYMPH: no cervical, supraclavicular, axillary, or inguinal adenopathy    Diagnostic Test Results:  Labs reviewed in Epic    ASSESSMENT/PLAN:   Abhishek was seen today for physical.    Diagnoses and all orders for this visit:    Routine history and physical examination of adult  -     Comprehensive metabolic panel (BMP + Alb, Alk Phos, ALT, AST, Total. Bili, TP)    Enlarged testicle  -     US Testicular & Scrotum w Doppler Ltd; Future  -     CBC with platelets    Screening for diabetes mellitus  -     Hemoglobin A1c    Lipid screening  -     Lipid panel reflex to direct LDL Fasting    Special screening for malignant neoplasms, colon  -     GASTROENTEROLOGY ADULT REF PROCEDURE ONLY; Future        Patient has been advised of split billing requirements and indicates understanding: Yes  COUNSELING:   Reviewed preventive health counseling, as reflected in patient instructions       Regular exercise       Healthy diet/nutrition       Vision screening       Hearing screening       Aspirin prophylaxis        Alcohol Use        Family planning       Safe sex practices/STD prevention       Consider Hep C screening for all patients one time for ages 18-79 years       Syphilis screening for high risk patients        Colon cancer screening       Prostate cancer screening       Advance Care Planning    Estimated body mass index is 24.69  "kg/m  as calculated from the following:    Height as of this encounter: 1.803 m (5' 11\").    Weight as of this encounter: 80.3 kg (177 lb).         He reports that he has never smoked. He has never used smokeless tobacco.      Counseling Resources:  ATP IV Guidelines  Pooled Cohorts Equation Calculator  FRAX Risk Assessment  ICSI Preventive Guidelines  Dietary Guidelines for Americans, 2010  USDA's MyPlate  ASA Prophylaxis  Lung CA Screening    Rafal Penaloza MD  [facility]  " Detail Level: Simple Otc Regimen: Recommended SPF 30 or greater.

## 2025-08-06 ENCOUNTER — TELEPHONE (OUTPATIENT)
Dept: FAMILY MEDICINE | Facility: CLINIC | Age: 50
End: 2025-08-06
Payer: COMMERCIAL

## (undated) DEVICE — LINEN GOWN XLG 5407

## (undated) DEVICE — PACK GOWN 3/PK DISP XL SBA32GPFCB

## (undated) DEVICE — TUBING SUCTION MEDI-VAC SOFT 3/16"X20' N520A

## (undated) DEVICE — SU CHROMIC 3-0 SH 27" G122H

## (undated) DEVICE — SUCTION MANIFOLD NEPTUNE 2 SYS 4 PORT 0702-020-000

## (undated) DEVICE — SYSTEM CLEARIFY VISUALIZATION 21-345

## (undated) DEVICE — DAVINCI XI DRAPE COLUMN 470341

## (undated) DEVICE — SU VICRYL 3-0 SH 27" UND J416H

## (undated) DEVICE — GLOVE BIOGEL PI MICRO SZ 7.5 48575

## (undated) DEVICE — OINTMENT ANTIBIOTIC BACITRACIN ZINC .9 G 1171

## (undated) DEVICE — SUCTION TIP YANKAUER W/O VENT K86

## (undated) DEVICE — DAVINCI XI GRASPER ENDOWRIST PROGRASP 470093

## (undated) DEVICE — ESU GROUND PAD UNIVERSAL W/O CORD

## (undated) DEVICE — TUBING INSUFFLATION W/FILTER CPC TO LUER 620-030-301

## (undated) DEVICE — BLADE CLIPPER SGL USE 9680

## (undated) DEVICE — DAVINCI XI MONOPOLAR SCISSORS HOT SHEARS 8MM 470179

## (undated) DEVICE — DRSG STERI STRIP 1/2X4" R1547

## (undated) DEVICE — LINEN TOWEL PACK X5 5464

## (undated) DEVICE — DAVINCI XI DRAPE ARM 470015

## (undated) DEVICE — Device

## (undated) DEVICE — DRAPE MAYO STAND 23X54 8337

## (undated) DEVICE — LINEN ORTHO PACK 5446

## (undated) DEVICE — BLADE CLIPPER DISP 4406

## (undated) DEVICE — SOL WATER IRRIG 1000ML BOTTLE 2F7114

## (undated) DEVICE — SYR 10ML FINGER CONTROL W/O NDL 309695

## (undated) DEVICE — DRAPE LAP W/ARMBOARD 29410

## (undated) DEVICE — SOL NACL 0.9% IRRIG 1000ML BOTTLE 2F7124

## (undated) DEVICE — SU MONOCRYL 4-0 PS-2 27" UND Y426H

## (undated) DEVICE — DRAIN JACKSON PRATT RESERVOIR 100ML SU130-1305

## (undated) DEVICE — LINEN TOWEL PACK X30 5481

## (undated) DEVICE — ESU GROUND PAD ADULT REM W/15' CORD E7507DB

## (undated) DEVICE — SU MONOCRYL 4-0 PS-2 18" UND Y496G

## (undated) DEVICE — GLOVE PROTEXIS POWDER FREE SMT 7.5  2D72PT75X

## (undated) DEVICE — SU ETHILON 3-0 PS-1 18" 1663H

## (undated) DEVICE — LINEN GOWN X4 5410

## (undated) DEVICE — SYR EAR 3OZ BULB IRR STRL DISP BLU PVC 4173

## (undated) DEVICE — DRAIN JACKSON PRATT 10FR ROUND SU130-1321

## (undated) DEVICE — SU VICRYL 4-0 TIE 3X18" J643H

## (undated) DEVICE — PREP CHLORAPREP 26ML TINTED ORANGE  260815

## (undated) DEVICE — DAVINCI HOT SHEARS TIP COVER  400180

## (undated) DEVICE — DRSG KERLIX FLUFFS X5

## (undated) DEVICE — DRAPE LAP TRANSVERSE 29421

## (undated) DEVICE — LINEN TOWEL PACK X6 WHITE 5487

## (undated) DEVICE — NDL INSUFFLATION 13GA 120MM C2201

## (undated) DEVICE — STRAP KNEE/BODY 31143004

## (undated) DEVICE — DAVINCI XI FCP BIPOLAR FENESTRATED 470205

## (undated) DEVICE — NDL 18GA 1.5" 305196

## (undated) DEVICE — DAVINCI XI SEAL UNIVERSAL 5-8MM 470361

## (undated) DEVICE — SU VICRYL 3-0 SH-1 27" J311H

## (undated) DEVICE — SU STRATAFIX PDS PLUS 3-0 SPIRAL SH 15CM SXPP1B420

## (undated) RX ORDER — FENTANYL CITRATE 50 UG/ML
INJECTION, SOLUTION INTRAMUSCULAR; INTRAVENOUS
Status: DISPENSED
Start: 2025-01-24

## (undated) RX ORDER — DEXAMETHASONE SODIUM PHOSPHATE 4 MG/ML
INJECTION, SOLUTION INTRA-ARTICULAR; INTRALESIONAL; INTRAMUSCULAR; INTRAVENOUS; SOFT TISSUE
Status: DISPENSED
Start: 2020-01-09

## (undated) RX ORDER — EPHEDRINE SULFATE 50 MG/ML
INJECTION, SOLUTION INTRAMUSCULAR; INTRAVENOUS; SUBCUTANEOUS
Status: DISPENSED
Start: 2025-01-24

## (undated) RX ORDER — ONDANSETRON 2 MG/ML
INJECTION INTRAMUSCULAR; INTRAVENOUS
Status: DISPENSED
Start: 2020-01-09

## (undated) RX ORDER — ACETAMINOPHEN 325 MG/1
TABLET ORAL
Status: DISPENSED
Start: 2020-01-09

## (undated) RX ORDER — HYDROCODONE BITARTRATE AND ACETAMINOPHEN 5; 325 MG/1; MG/1
TABLET ORAL
Status: DISPENSED
Start: 2020-01-09

## (undated) RX ORDER — FENTANYL CITRATE 50 UG/ML
INJECTION, SOLUTION INTRAMUSCULAR; INTRAVENOUS
Status: DISPENSED
Start: 2020-01-09

## (undated) RX ORDER — BUPIVACAINE HYDROCHLORIDE 5 MG/ML
INJECTION, SOLUTION EPIDURAL; INTRACAUDAL
Status: DISPENSED
Start: 2020-01-09

## (undated) RX ORDER — ACETAMINOPHEN 325 MG/1
TABLET ORAL
Status: DISPENSED
Start: 2025-01-24

## (undated) RX ORDER — BUPIVACAINE HYDROCHLORIDE 5 MG/ML
INJECTION, SOLUTION EPIDURAL; INTRACAUDAL
Status: DISPENSED
Start: 2025-01-24

## (undated) RX ORDER — GABAPENTIN 300 MG/1
CAPSULE ORAL
Status: DISPENSED
Start: 2020-01-09

## (undated) RX ORDER — SODIUM CHLORIDE, SODIUM LACTATE, POTASSIUM CHLORIDE, CALCIUM CHLORIDE 600; 310; 30; 20 MG/100ML; MG/100ML; MG/100ML; MG/100ML
INJECTION, SOLUTION INTRAVENOUS
Status: DISPENSED
Start: 2020-01-09

## (undated) RX ORDER — PROPOFOL 10 MG/ML
INJECTION, EMULSION INTRAVENOUS
Status: DISPENSED
Start: 2025-01-24

## (undated) RX ORDER — LIDOCAINE HYDROCHLORIDE AND EPINEPHRINE 15; 5 MG/ML; UG/ML
INJECTION, SOLUTION EPIDURAL
Status: DISPENSED
Start: 2017-08-08

## (undated) RX ORDER — KETOROLAC TROMETHAMINE 30 MG/ML
INJECTION, SOLUTION INTRAMUSCULAR; INTRAVENOUS
Status: DISPENSED
Start: 2025-01-24

## (undated) RX ORDER — CEFAZOLIN SODIUM 2 G/100ML
INJECTION, SOLUTION INTRAVENOUS
Status: DISPENSED
Start: 2020-01-09

## (undated) RX ORDER — HYDROMORPHONE HYDROCHLORIDE 1 MG/ML
INJECTION, SOLUTION INTRAMUSCULAR; INTRAVENOUS; SUBCUTANEOUS
Status: DISPENSED
Start: 2020-01-09

## (undated) RX ORDER — ERYTHROMYCIN 5 MG/G
OINTMENT OPHTHALMIC
Status: DISPENSED
Start: 2017-08-08

## (undated) RX ORDER — CEFAZOLIN SODIUM/WATER 2 G/20 ML
SYRINGE (ML) INTRAVENOUS
Status: DISPENSED
Start: 2025-01-24